# Patient Record
Sex: FEMALE | Race: OTHER | NOT HISPANIC OR LATINO | URBAN - METROPOLITAN AREA
[De-identification: names, ages, dates, MRNs, and addresses within clinical notes are randomized per-mention and may not be internally consistent; named-entity substitution may affect disease eponyms.]

---

## 2019-09-30 ENCOUNTER — EMERGENCY (EMERGENCY)
Facility: HOSPITAL | Age: 44
LOS: 0 days | Discharge: HOME | End: 2019-09-30
Attending: EMERGENCY MEDICINE | Admitting: EMERGENCY MEDICINE
Payer: COMMERCIAL

## 2019-09-30 ENCOUNTER — RESULT REVIEW (OUTPATIENT)
Age: 44
End: 2019-09-30

## 2019-09-30 VITALS
HEIGHT: 62 IN | SYSTOLIC BLOOD PRESSURE: 129 MMHG | RESPIRATION RATE: 18 BRPM | OXYGEN SATURATION: 99 % | WEIGHT: 149.91 LBS | DIASTOLIC BLOOD PRESSURE: 70 MMHG | HEART RATE: 86 BPM | TEMPERATURE: 98 F

## 2019-09-30 VITALS
TEMPERATURE: 98 F | SYSTOLIC BLOOD PRESSURE: 135 MMHG | HEART RATE: 78 BPM | RESPIRATION RATE: 18 BRPM | DIASTOLIC BLOOD PRESSURE: 72 MMHG

## 2019-09-30 DIAGNOSIS — D64.9 ANEMIA, UNSPECIFIED: ICD-10-CM

## 2019-09-30 DIAGNOSIS — N93.9 ABNORMAL UTERINE AND VAGINAL BLEEDING, UNSPECIFIED: ICD-10-CM

## 2019-09-30 DIAGNOSIS — R53.1 WEAKNESS: ICD-10-CM

## 2019-09-30 DIAGNOSIS — I10 ESSENTIAL (PRIMARY) HYPERTENSION: ICD-10-CM

## 2019-09-30 LAB
ABO RH CONFIRMATION: SIGNIFICANT CHANGE UP
ALBUMIN SERPL ELPH-MCNC: 4.3 G/DL — SIGNIFICANT CHANGE UP (ref 3.5–5.2)
ALP SERPL-CCNC: 65 U/L — SIGNIFICANT CHANGE UP (ref 30–115)
ALT FLD-CCNC: 20 U/L — SIGNIFICANT CHANGE UP (ref 0–41)
ANION GAP SERPL CALC-SCNC: 10 MMOL/L — SIGNIFICANT CHANGE UP (ref 7–14)
APTT BLD: 27.7 SEC — SIGNIFICANT CHANGE UP (ref 27–39.2)
AST SERPL-CCNC: 38 U/L — SIGNIFICANT CHANGE UP (ref 0–41)
BASOPHILS # BLD AUTO: 0.05 K/UL — SIGNIFICANT CHANGE UP (ref 0–0.2)
BASOPHILS NFR BLD AUTO: 0.6 % — SIGNIFICANT CHANGE UP (ref 0–1)
BILIRUB SERPL-MCNC: <0.2 MG/DL — SIGNIFICANT CHANGE UP (ref 0.2–1.2)
BLD GP AB SCN SERPL QL: SIGNIFICANT CHANGE UP
BUN SERPL-MCNC: 13 MG/DL — SIGNIFICANT CHANGE UP (ref 10–20)
CA-I SERPL-SCNC: 1.18 MMOL/L — SIGNIFICANT CHANGE UP (ref 1.12–1.3)
CALCIUM SERPL-MCNC: 9 MG/DL — SIGNIFICANT CHANGE UP (ref 8.5–10.1)
CHLORIDE SERPL-SCNC: 105 MMOL/L — SIGNIFICANT CHANGE UP (ref 98–110)
CO2 SERPL-SCNC: 22 MMOL/L — SIGNIFICANT CHANGE UP (ref 17–32)
CREAT SERPL-MCNC: 0.8 MG/DL — SIGNIFICANT CHANGE UP (ref 0.7–1.5)
EOSINOPHIL # BLD AUTO: 0.28 K/UL — SIGNIFICANT CHANGE UP (ref 0–0.7)
EOSINOPHIL NFR BLD AUTO: 3.3 % — SIGNIFICANT CHANGE UP (ref 0–8)
GAS PNL BLDV: 142 MMOL/L — SIGNIFICANT CHANGE UP (ref 136–145)
GAS PNL BLDV: SIGNIFICANT CHANGE UP
GLUCOSE SERPL-MCNC: 105 MG/DL — HIGH (ref 70–99)
HCG SERPL QL: NEGATIVE — SIGNIFICANT CHANGE UP
HCO3 BLDV-SCNC: 25 MMOL/L — SIGNIFICANT CHANGE UP (ref 22–29)
HCT VFR BLD CALC: 22.9 % — LOW (ref 37–47)
HCT VFR BLDA CALC: 24.8 % — LOW (ref 34–44)
HGB BLD CALC-MCNC: 8.1 G/DL — LOW (ref 14–18)
HGB BLD-MCNC: 7.1 G/DL — LOW (ref 12–16)
IMM GRANULOCYTES NFR BLD AUTO: 0.4 % — HIGH (ref 0.1–0.3)
INR BLD: 0.88 RATIO — SIGNIFICANT CHANGE UP (ref 0.65–1.3)
LACTATE BLDV-MCNC: 0.9 MMOL/L — SIGNIFICANT CHANGE UP (ref 0.5–1.6)
LYMPHOCYTES # BLD AUTO: 1.88 K/UL — SIGNIFICANT CHANGE UP (ref 1.2–3.4)
LYMPHOCYTES # BLD AUTO: 22.1 % — SIGNIFICANT CHANGE UP (ref 20.5–51.1)
MCHC RBC-ENTMCNC: 24.7 PG — LOW (ref 27–31)
MCHC RBC-ENTMCNC: 31 G/DL — LOW (ref 32–37)
MCV RBC AUTO: 79.5 FL — LOW (ref 81–99)
MONOCYTES # BLD AUTO: 0.42 K/UL — SIGNIFICANT CHANGE UP (ref 0.1–0.6)
MONOCYTES NFR BLD AUTO: 4.9 % — SIGNIFICANT CHANGE UP (ref 1.7–9.3)
NEUTROPHILS # BLD AUTO: 5.85 K/UL — SIGNIFICANT CHANGE UP (ref 1.4–6.5)
NEUTROPHILS NFR BLD AUTO: 68.7 % — SIGNIFICANT CHANGE UP (ref 42.2–75.2)
NRBC # BLD: 0 /100 WBCS — SIGNIFICANT CHANGE UP (ref 0–0)
PCO2 BLDV: 43 MMHG — SIGNIFICANT CHANGE UP (ref 41–51)
PH BLDV: 7.38 — SIGNIFICANT CHANGE UP (ref 7.26–7.43)
PLATELET # BLD AUTO: 541 K/UL — HIGH (ref 130–400)
PO2 BLDV: 46 MMHG — HIGH (ref 20–40)
POTASSIUM BLDV-SCNC: 4 MMOL/L — SIGNIFICANT CHANGE UP (ref 3.3–5.6)
POTASSIUM SERPL-MCNC: 5.8 MMOL/L — HIGH (ref 3.5–5)
POTASSIUM SERPL-SCNC: 5.8 MMOL/L — HIGH (ref 3.5–5)
PROT SERPL-MCNC: 7.2 G/DL — SIGNIFICANT CHANGE UP (ref 6–8)
PROTHROM AB SERPL-ACNC: 10.1 SEC — SIGNIFICANT CHANGE UP (ref 9.95–12.87)
RBC # BLD: 2.88 M/UL — LOW (ref 4.2–5.4)
RBC # FLD: 15.1 % — HIGH (ref 11.5–14.5)
SAO2 % BLDV: 79 % — SIGNIFICANT CHANGE UP
SODIUM SERPL-SCNC: 137 MMOL/L — SIGNIFICANT CHANGE UP (ref 135–146)
WBC # BLD: 8.51 K/UL — SIGNIFICANT CHANGE UP (ref 4.8–10.8)
WBC # FLD AUTO: 8.51 K/UL — SIGNIFICANT CHANGE UP (ref 4.8–10.8)

## 2019-09-30 PROCEDURE — 58100 BIOPSY OF UTERUS LINING: CPT

## 2019-09-30 PROCEDURE — 88305 TISSUE EXAM BY PATHOLOGIST: CPT | Mod: 26

## 2019-09-30 PROCEDURE — 99212 OFFICE O/P EST SF 10 MIN: CPT | Mod: 25

## 2019-09-30 PROCEDURE — 76856 US EXAM PELVIC COMPLETE: CPT | Mod: 26

## 2019-09-30 PROCEDURE — 99218: CPT

## 2019-09-30 NOTE — ED PROVIDER NOTE - OBJECTIVE STATEMENT
45 yo female, pmh of htn, presents to ed for abnormal hgb. Pt states had labs outpt friday, had hgb of 6.5. Pt states weakness and vaginal bleeding for past 4 weeks, using 4-6 pads per day for bleeding, does not had gyn fu.  with c sections, uncomplicated. Denies fever, chills, cp, sob, abd pain, nvd, numbness, tingling, dizziness, visual changes, dysuria.

## 2019-09-30 NOTE — ED PROVIDER NOTE - ATTENDING CONTRIBUTION TO CARE
45 y/o female with hx of HTN, dysfunctional uterine bleeding, presents to ED for Low H/H, 6.5 as outpatient.  No epistaxis, bruising or BRBPR/melena.  PE:  agree with above.  A/P:  Anemia due to vaginal bleeding.  GYN consult and Transfuse PRBC

## 2019-09-30 NOTE — ED CDU PROVIDER INITIAL DAY NOTE - NS ED ROS FT
Constitutional: See HPI.  Eyes: No visual changes, eye pain or discharge.  ENMT: No hearing changes, pain, discharge or infections. No neck pain or stiffness.  Cardiac: + chest pain, SOB; No edema. No chest pain with exertion.  Respiratory: No cough or respiratory distress.   GI: No nausea, vomiting, diarrhea or abdominal pain.  : +vaginal bleeding; No dysuria, frequency or burning.  MS: No myalgia, muscle weakness, joint pain or back pain.  Neuro: +lightheadedness; No headache or weakness. No LOC.  Skin: No skin rash.  Endo: No hx of DM, thyroid disease  Except as documented in HPI, all other review of systems is negative

## 2019-09-30 NOTE — ED PROVIDER NOTE - PHYSICAL EXAMINATION
Physical Exam    Vital Signs: I have reviewed the initial vital signs.  Constitutional: well-nourished, appears stated age, no acute distress  Eyes: Conjunctiva pink, Sclera clear,  Cardiovascular: S1 and S2, regular rate, regular rhythm, well-perfused extremities, radial pulses equal and 2+, pedal pulses 2+ and equal, cap refill less than 2 seconds  Respiratory: unlabored respiratory effort, clear to auscultation bilaterally no wheezing, rales and rhonchi  Gastrointestinal: soft, non-tender abdomen, no pulsatile mass, normal bowl sounds  Musculoskeletal: supple neck, no lower extremity edema, no midline tenderness  Integumentary: warm, dry, no rash  Neurologic: awake, alert, nvi, extremities’ motor and sensory functions grossly intact Physical Exam    Vital Signs: I have reviewed the initial vital signs.  Constitutional: well-nourished, appears stated age, no acute distress  Eyes: Conjunctiva pink, Sclera clear,  Cardiovascular: S1 and S2, regular rate, regular rhythm, well-perfused extremities, radial pulses equal and 2+, pedal pulses 2+ and equal, cap refill less than 2 seconds  Respiratory: unlabored respiratory effort, clear to auscultation bilaterally no wheezing, rales and rhonchi  Gastrointestinal: soft, non-tender abdomen, no pulsatile mass, normal bowl sounds  : Chaperoned with RN Luba, normal external exam, blood in vaginal vault, no cmt, cervix closed.   Musculoskeletal: supple neck, no lower extremity edema, no midline tenderness  Integumentary: warm, dry, no rash  Neurologic: awake, alert, nvi, extremities’ motor and sensory functions grossly intact

## 2019-09-30 NOTE — CONSULT NOTE ADULT - ASSESSMENT
43 yo P3, likely perimenopausal, w/ abnormal uterine bleeding, r/o polyp/fibroid vs hyperplasia vs cancer, currently clinically and hemodynamically stable.    Discussed need for endometrial biopsy to r/o polyp/fibroid vs hyperplasia vs cancer. Explained risks of procedure, including bleeding, infection, perforation. Pt expressed understand, informed consent obtained.     - f/up EmBx pathology  - c/w transfusion 2U PRBCs  - f/up at [facility] for results and outpatient GYN care  - bleeding precautions  - dispo per ED    Discussed with Dr. Arriaga and to be discussed with Dr. Klein. 43 yo P3, likely perimenopausal, w/ abnormal uterine bleeding, r/o polyp or fibroid vs hyperplasia vs cancer, currently clinically and hemodynamically stable.    Discussed need for endometrial biopsy to r/o polyp or fibroid vs hyperplasia vs cancer. Explained risks of procedure, including bleeding, infection, perforation. Pt expressed understand, informed consent obtained, procedure completed without complication.     - f/up EmBx pathology  - c/w transfusion 2U PRBCs  - f/up at [facility] for results and outpatient GYN care  - bleeding precautions  - dispo per ED    Discussed with Dr. Arriaga and to be discussed with Dr. Klein. 45 yo P3, likely perimenopausal, w/ abnormal uterine bleeding, r/o polyp or fibroid vs hyperplasia vs cancer, currently clinically and hemodynamically stable.    Discussed need for endometrial biopsy to r/o polyp or fibroid vs hyperplasia vs cancer. Explained risks of procedure, including bleeding, infection, perforation. Pt expressed understand, informed consent obtained, procedure completed without complication.     - f/up EmBx pathology  - c/w transfusion 2U PRBCs  - f/up at [facility] for results and outpatient GYN care  - bleeding precautions  - dispo per ED    Discussed with Dr. Arriaga and Dr. Klein.

## 2019-09-30 NOTE — ED ADULT NURSE NOTE - OBJECTIVE STATEMENT
Pt c/o vaginal bleeding and was notified by doctor that Hgb is low on Friday, was told to go to ER. Pt denies any dizziness, chest pain, SOB, weakness. Pt is A&Ox4, steady on ambulation.

## 2019-09-30 NOTE — ED CDU PROVIDER INITIAL DAY NOTE - DETAILS
Patient presented to ED for lightheadedness, SOB, chest pain, 4 weeks of vaginal bleeding, will be placed in obs unit for blood transfusion.

## 2019-09-30 NOTE — ED CDU PROVIDER DISPOSITION NOTE - NSFOLLOWUPCLINICS_GEN_ALL_ED_FT
[facility]  OB/GYN  [address] [place] [address]  Phone: [phone]  Fax:   Follow Up Time: 1-3 Days

## 2019-09-30 NOTE — ED CDU PROVIDER DISPOSITION NOTE - NSFOLLOWUPINSTRUCTIONS_ED_ALL_ED_FT
Anemia    Anemia is a condition in which the concentration of red blood cells or hemoglobin in the blood is below normal. Hemoglobin is a substance in red blood cells that carries oxygen to the tissues of the body. Anemia results in not enough oxygen reaching these tissues which can cause symptoms such as weakness, dizziness/lightheadedness, shortness of breath, chest pain, paleness, or nausea. The cause of your anemia may or may not be determined immediately. If your hemoglobin was dangerously low, you may have received a blood transfusion. Usually reactions to transfusions occur immediately but monitor yourself for any fevers, rash, or shortness of breath.    SEEK IMMEDIATE MEDICAL CARE IF YOU HAVE ANY OF THE FOLLOWING SYMPTOMS: extreme weakness/chest pain/shortness of breath, black or bloody stools, vomiting blood, fainting, fever, or any signs of dehydration.

## 2019-09-30 NOTE — ED PROVIDER NOTE - NS ED ROS FT
Constitutional: (-) fever, (-) chills, (+) weakness  Eyes: (-) visual changes  ENT: (-) nasal congestions  Cardiovascular: (-) chest pain, (-) syncope  Respiratory: (-) cough, (-) shortness of breath, (-) dyspnea,   Gastrointestinal: (-) vomiting, (-) diarrhea, (-)nausea  Musculoskeletal: (-) neck pain, (-) back pain, (-) joint pain  Integumentary: (-) rash  Neurological: (-) headache,  (-) dizziness, (-) tingling, (-)numbness  Peripheral Vascular: (-) pain while walking, (-) leg swelling,  : (-)frequency, (-)polyuria, (-)dysuria, (-)nocturia, (-) hematuria, (+) vaginal bleeding  Allergic/Immunologic: (-) pruritus
Rashawn Pablo(Attending)

## 2019-09-30 NOTE — ED CDU PROVIDER INITIAL DAY NOTE - PHYSICAL EXAMINATION
CONSTITUTIONAL: Well-developed; well-nourished; in no acute distress.   SKIN: warm, dry; no pallor   HEAD: Normocephalic; atraumatic.  EYES: no conj injection; mild conjunctival pallor   ENT: No nasal discharge; airway clear.  NECK: Supple; non tender.  CARD: S1, S2 normal; no murmurs, gallops, or rubs. Regular rate and rhythm.   RESP: No wheezes, rales or rhonchi.  ABD: soft ntnd  EXT: Normal ROM.  No clubbing, cyanosis or edema.   NEURO: Alert, oriented, grossly unremarkable  PSYCH: Cooperative, appropriate.

## 2019-09-30 NOTE — ED CDU PROVIDER DISPOSITION NOTE - CLINICAL COURSE
Patient seen and evaluated by GYN.  Biopsy done.  Will f/u as OP.  VSS.  Transfusion uneventful.  D/C home.

## 2019-09-30 NOTE — CONSULT NOTE ADULT - SUBJECTIVE AND OBJECTIVE BOX
Chief Complaint: vaginal bleeding    HPI: 43 yo P3 w/ LMP 19 presented to ED with vaginal bleeding and anemia symptoms. Pt reports heavy vaginal bleeding since beginning of last period, changing approx 8x/day with passage of heavy clots. Has had to change pad 1x/night as well. Yesterday, bleeding significantly lessened. She has only had to change her pad once today. Has felt lightheadedness, dizziness, shortness of breath, fatigue for the past week. Seen by PMD today who said her blood count was low and to come to ED for transfusion. Has never had bleeding like this before. Denies fever, chills, CP, N/V, abdominal pain, diarrhea/constipation, dysuria, urgency/frequency.     Ob/Gyn History:  , C/Sx3, d&c x1 for RPOC                 LMP -  18                 Cycle Length -  typically regular but has been irregular for the last 6 months  Denies history of ovarian cysts, uterine fibroids, abnormal paps, or STIs  Last Pap Smear - several years ago, normal  Last Mammogram - 3 months ago, normal    Denies the following: constitutional symptoms, visual symptoms, cardiovascular symptoms, respiratory symptoms, GI symptoms, musculoskeletal symptoms, skin symptoms, neurologic symptoms, hematologic symptoms, allergic symptoms, psychiatric symptoms  Except any pertinent positives listed.     Home Medications:  Norvasc 5mg qd    Allergies  ASA (rash)    PAST MEDICAL & SURGICAL HISTORY:  HTN  c/s x3  d&c x3    FAMILY HISTORY:  breast cancer in maternal aunt (diagnosed in 50s)  lung cancer (dad)    SOCIAL HISTORY: Denies cigarette use, alcohol use, or illicit drug use    Vital Signs Last 24 Hrs  T(F): 98.2 (30 Sep 2019 17:00), Max: 98.2 (30 Sep 2019 17:00)  HR: 82 (30 Sep 2019 17:00) (82 - 86)  BP: 126/86 (30 Sep 2019 17:00) (126/86 - 129/70)  RR: 18 (30 Sep 2019 17:00) (18 - 18)    General Appearance - AAOx3, NAD  Heart - S1S2 regular rate and rhythm  Lung - CTA Bilaterally  Abdomen - Soft, nontender, nondistended, no rebound, no rigidity, no guarding, bowel sounds present    GYN/Pelvis:    Labia Majora - Normal  Labia Minora - Normal  Clitoris - Normal  Urethra - Normal  Vagina - 5cc dark blood in vagina  Cervix - no CMT, closed, no active bleeding per cervical os    Uterus:  Size - 6wk size, anteverted  Tenderness - None  Mass - None  Freely mobile    Adnexa:  Masses - None  Tenderness - None    Speculum inserted into vaginal, blood wiped away using 4x4. betadine used to clean cervix. Endometrial pipelle inserted into uterine cavity, specimen obtained and sent to pathology.     LABS:                        7.1    8.51  )-----------( 541      ( 30 Sep 2019 14:20 )             22.9       ABO Rh Confirmation: O POS (19 @ 15:06)  ABO RH Interpretation: O POS [2019 15:46  ELEE23  No historical record of blood group and Rh, requires a second sample for  retype prior to the release of any blood products.  For prenatal, a  second sample on next visit.] (19 @ 14:20)  Antibody Screen: NEG (19 @ 14:20)        137  |  105  |  13  ----------------------------<  105<H>  5.8<H>   |  22  |  0.8    Ca    9.0      30 Sep 2019 14:20    TPro  7.2  /  Alb  4.3  /  TBili  <0.2  /  DBili  x   /  AST  38  /  ALT  20  /  AlkPhos  65      PT/INR - ( 30 Sep 2019 14:20 )   PT: 10.10 sec;   INR: 0.88 ratio         PTT - ( 30 Sep 2019 14:20 )  PTT:27.7 sec        RADIOLOGY & ADDITIONAL STUDIES:  < from: US Pelvis Complete (19 @ 15:53) >  FINDINGS:    UTERUS: Measures 5.9 x 5.1 x 3.2 cm with normal echogenicity and   morphology. The endometrial echo complex measures 0.5 cm, which is   normal.     RIGHT OVARY: measures 2.0 x 1.9 x 1.7 cm, and is unremarkable. Doppler   flow is demonstrated to the right ovary.     LEFT OVARY: measures 3.2 x 2.9 x 2.8 cm and contains a 2.4 cm dominant   follicle/cyst. Doppler flow is demonstrated to the left ovary.     OTHER: No free fluid in the pelvis.    IMPRESSION:    Normal thickness endometrium.  Left ovarian dominant follicle/cyst. Chief Complaint: vaginal bleeding    HPI: 45 yo P3 w/ LMP 19 presented to ED with vaginal bleeding and anemia symptoms. Pt reports heavy vaginal bleeding since beginning of last period, changing approx 8x/day with passage of heavy clots. Has had to change pad 1x/night as well. Yesterday, bleeding significantly lessened. She has only had to change her pad once today. Has felt lightheadedness, dizziness, shortness of breath, fatigue for the past week. Seen by PMD today who said her blood count was low and to come to ED for transfusion. Has never had bleeding like this before. Denies fever, chills, CP, N/V, abdominal pain, diarrhea/constipation, dysuria, urgency/frequency.     Ob/Gyn History:  , C/Sx3, d&c x1 for RPOC                 LMP -  19                 Cycle Length -  typically regular but has been irregular for the last 6 months  Denies history of ovarian cysts, uterine fibroids, abnormal paps, or STIs  Last Pap Smear - several years ago, normal  Last Mammogram - 3 months ago, normal    Denies the following: constitutional symptoms, visual symptoms, cardiovascular symptoms, respiratory symptoms, GI symptoms, musculoskeletal symptoms, skin symptoms, neurologic symptoms, hematologic symptoms, allergic symptoms, psychiatric symptoms  Except any pertinent positives listed.     Home Medications:  Norvasc 5mg qd    Allergies  ASA (rash)    PAST MEDICAL & SURGICAL HISTORY:  HTN  c/s x3  d&c x3    FAMILY HISTORY:  breast cancer in maternal aunt (diagnosed in 50s)  lung cancer (dad)    SOCIAL HISTORY: Denies cigarette use, alcohol use, or illicit drug use    Vital Signs Last 24 Hrs  T(F): 98.2 (30 Sep 2019 17:00), Max: 98.2 (30 Sep 2019 17:00)  HR: 82 (30 Sep 2019 17:00) (82 - 86)  BP: 126/86 (30 Sep 2019 17:00) (126/86 - 129/70)  RR: 18 (30 Sep 2019 17:00) (18 - 18)    General Appearance - AAOx3, NAD  Heart - S1S2 regular rate and rhythm  Lung - CTA Bilaterally  Abdomen - Soft, nontender, nondistended, no rebound, no rigidity, no guarding, bowel sounds present    GYN/Pelvis:    Labia Majora - Normal  Labia Minora - Normal  Clitoris - Normal  Urethra - Normal  Vagina - 5cc dark blood in vagina  Cervix - no CMT, closed, no active bleeding per cervical os    Uterus:  Size - 6wk size, anteverted  Tenderness - None  Mass - None  Freely mobile    Adnexa:  Masses - None  Tenderness - None    Endometrial Biopsy Procedure Note:  Written informed consent obtained. Speculum inserted into vaginal, blood wiped away using 4x4. betadine used to clean cervix. Endometrial pipelle inserted into uterine cavity, specimen obtained and sent to pathology.     LABS:                        7.1    8.51  )-----------( 541      ( 30 Sep 2019 14:20 )             22.9       ABO Rh Confirmation: O POS (19 @ 15:06)  ABO RH Interpretation: O POS [2019 15:46  ELEE23  No historical record of blood group and Rh, requires a second sample for  retype prior to the release of any blood products.  For prenatal, a  second sample on next visit.] (19 @ 14:20)  Antibody Screen: NEG (19 @ 14:20)        137  |  105  |  13  ----------------------------<  105<H>  5.8<H>   |  22  |  0.8    Ca    9.0      30 Sep 2019 14:20    TPro  7.2  /  Alb  4.3  /  TBili  <0.2  /  DBili  x   /  AST  38  /  ALT  20  /  AlkPhos  65      PT/INR - ( 30 Sep 2019 14:20 )   PT: 10.10 sec;   INR: 0.88 ratio         PTT - ( 30 Sep 2019 14:20 )  PTT:27.7 sec        RADIOLOGY & ADDITIONAL STUDIES:  < from: US Pelvis Complete (19 @ 15:53) >  FINDINGS:    UTERUS: Measures 5.9 x 5.1 x 3.2 cm with normal echogenicity and   morphology. The endometrial echo complex measures 0.5 cm, which is   normal.     RIGHT OVARY: measures 2.0 x 1.9 x 1.7 cm, and is unremarkable. Doppler   flow is demonstrated to the right ovary.     LEFT OVARY: measures 3.2 x 2.9 x 2.8 cm and contains a 2.4 cm dominant   follicle/cyst. Doppler flow is demonstrated to the left ovary.     OTHER: No free fluid in the pelvis.    IMPRESSION:    Normal thickness endometrium.  Left ovarian dominant follicle/cyst.

## 2019-09-30 NOTE — ED CDU PROVIDER DISPOSITION NOTE - PATIENT PORTAL LINK FT
You can access the FollowMyHealth Patient Portal offered by [facility] [place] by registering at the following website: [email or web address]. By joining [employer]’s FollowMyHealth portal, you will also be able to view your health information using other applications (apps) compatible with our system.

## 2019-09-30 NOTE — ED CDU PROVIDER INITIAL DAY NOTE - OBJECTIVE STATEMENT
45 y/o female with no pmhx presents for low hemoglobin. Patient states she has had heavy menstrual bleeding for the past 4 weeks, uses approximately 6-8 pads per day. Patient states she had blood work done at PMD's last week, results came back with hb of 6.5 and was advised to come to ER. Patient admits to lightheadedness, sob, chest pain on exertion and generalized fatigue. Denies syncope.

## 2019-10-01 PROBLEM — Z00.00 ENCOUNTER FOR PREVENTIVE HEALTH EXAMINATION: Status: ACTIVE | Noted: 2019-10-01

## 2019-10-03 LAB — SURGICAL PATHOLOGY STUDY: SIGNIFICANT CHANGE UP

## 2019-10-16 ENCOUNTER — OUTPATIENT (OUTPATIENT)
Dept: OUTPATIENT SERVICES | Facility: HOSPITAL | Age: 44
LOS: 1 days | Discharge: HOME | End: 2019-10-16

## 2019-10-16 ENCOUNTER — APPOINTMENT (OUTPATIENT)
Dept: OBGYN | Facility: CLINIC | Age: 44
End: 2019-10-16
Payer: COMMERCIAL

## 2019-10-16 VITALS
BODY MASS INDEX: 31.78 KG/M2 | DIASTOLIC BLOOD PRESSURE: 92 MMHG | WEIGHT: 168.31 LBS | SYSTOLIC BLOOD PRESSURE: 128 MMHG | HEIGHT: 61 IN

## 2019-10-16 DIAGNOSIS — Z86.79 PERSONAL HISTORY OF OTHER DISEASES OF THE CIRCULATORY SYSTEM: ICD-10-CM

## 2019-10-16 DIAGNOSIS — Z80.3 FAMILY HISTORY OF MALIGNANT NEOPLASM OF BREAST: ICD-10-CM

## 2019-10-16 DIAGNOSIS — N71.9 INFLAMMATORY DISEASE OF UTERUS, UNSPECIFIED: ICD-10-CM

## 2019-10-16 DIAGNOSIS — N92.0 EXCESSIVE AND FREQUENT MENSTRUATION WITH REGULAR CYCLE: ICD-10-CM

## 2019-10-16 PROCEDURE — 99396 PREV VISIT EST AGE 40-64: CPT

## 2019-10-16 RX ORDER — DOXYCYCLINE HYCLATE 100 MG/1
100 CAPSULE ORAL TWICE DAILY
Qty: 20 | Refills: 0 | Status: ACTIVE | COMMUNITY
Start: 2019-10-16 | End: 1900-01-01

## 2019-10-16 RX ORDER — METRONIDAZOLE 500 MG/1
500 TABLET ORAL
Qty: 20 | Refills: 0 | Status: ACTIVE | COMMUNITY
Start: 2019-10-16 | End: 1900-01-01

## 2019-10-16 NOTE — PHYSICAL EXAM
[Awake] : awake [Alert] : alert [Soft] : soft [Oriented x3] : oriented to person, place, and time [Normal] : uterus [No Bleeding] : there was no active vaginal bleeding [Pap Obtained] : a Pap smear was performed [Normal Position] : in a normal position [Uterine Adnexae] : were not tender and not enlarged [RRR, No Murmurs] : RRR, no murmurs [CTAB] : CTAB [Acute Distress] : no acute distress [LAD] : no lymphadenopathy [Thyroid Nodule] : no thyroid nodule [Goiter] : no goiter [Mass] : no breast mass [Nipple Discharge] : no nipple discharge [Axillary LAD] : no axillary lymphadenopathy [Tender] : non tender [Discharge] : had no discharge [Tenderness] : nontender

## 2019-10-22 DIAGNOSIS — Z01.419 ENCOUNTER FOR GYNECOLOGICAL EXAMINATION (GENERAL) (ROUTINE) WITHOUT ABNORMAL FINDINGS: ICD-10-CM

## 2019-10-22 DIAGNOSIS — N92.0 EXCESSIVE AND FREQUENT MENSTRUATION WITH REGULAR CYCLE: ICD-10-CM

## 2019-10-22 LAB — HPV HIGH+LOW RISK DNA PNL CVX: NOT DETECTED

## 2019-11-18 NOTE — ED ADULT TRIAGE NOTE - BP NONINVASIVE SYSTOLIC (MM HG)
129 Cres[employer] Intermediate Repair Preamble Text (Leave Blank If You Do Not Want): Undermining was performed with blunt dissection.

## 2020-04-23 ENCOUNTER — INPATIENT (INPATIENT)
Facility: HOSPITAL | Age: 45
LOS: 6 days | Discharge: HOME | End: 2020-04-30
Attending: INTERNAL MEDICINE | Admitting: INTERNAL MEDICINE
Payer: COMMERCIAL

## 2020-04-23 VITALS
RESPIRATION RATE: 17 BRPM | SYSTOLIC BLOOD PRESSURE: 104 MMHG | TEMPERATURE: 103 F | OXYGEN SATURATION: 92 % | DIASTOLIC BLOOD PRESSURE: 64 MMHG | HEART RATE: 115 BPM

## 2020-04-23 LAB
ALBUMIN SERPL ELPH-MCNC: 3.6 G/DL — SIGNIFICANT CHANGE UP (ref 3.5–5.2)
ALP SERPL-CCNC: 162 U/L — HIGH (ref 30–115)
ALT FLD-CCNC: 77 U/L — HIGH (ref 0–41)
ANION GAP SERPL CALC-SCNC: 18 MMOL/L — HIGH (ref 7–14)
AST SERPL-CCNC: 122 U/L — HIGH (ref 0–41)
BASOPHILS # BLD AUTO: 0.02 K/UL — SIGNIFICANT CHANGE UP (ref 0–0.2)
BASOPHILS NFR BLD AUTO: 0.1 % — SIGNIFICANT CHANGE UP (ref 0–1)
BILIRUB SERPL-MCNC: 0.6 MG/DL — SIGNIFICANT CHANGE UP (ref 0.2–1.2)
BUN SERPL-MCNC: 23 MG/DL — HIGH (ref 10–20)
CALCIUM SERPL-MCNC: 8.2 MG/DL — LOW (ref 8.5–10.1)
CHLORIDE SERPL-SCNC: 83 MMOL/L — LOW (ref 98–110)
CO2 SERPL-SCNC: 28 MMOL/L — SIGNIFICANT CHANGE UP (ref 17–32)
CREAT SERPL-MCNC: 1.3 MG/DL — SIGNIFICANT CHANGE UP (ref 0.7–1.5)
EOSINOPHIL # BLD AUTO: 0 K/UL — SIGNIFICANT CHANGE UP (ref 0–0.7)
EOSINOPHIL NFR BLD AUTO: 0 % — SIGNIFICANT CHANGE UP (ref 0–8)
GLUCOSE SERPL-MCNC: 149 MG/DL — HIGH (ref 70–99)
HCG SERPL QL: NEGATIVE — SIGNIFICANT CHANGE UP
HCT VFR BLD CALC: 38.8 % — SIGNIFICANT CHANGE UP (ref 37–47)
HGB BLD-MCNC: 12.9 G/DL — SIGNIFICANT CHANGE UP (ref 12–16)
IMM GRANULOCYTES NFR BLD AUTO: 1 % — HIGH (ref 0.1–0.3)
LACTATE SERPL-SCNC: 1.5 MMOL/L — SIGNIFICANT CHANGE UP (ref 0.7–2)
LYMPHOCYTES # BLD AUTO: 0.55 K/UL — LOW (ref 1.2–3.4)
LYMPHOCYTES # BLD AUTO: 3.7 % — LOW (ref 20.5–51.1)
MCHC RBC-ENTMCNC: 24.8 PG — LOW (ref 27–31)
MCHC RBC-ENTMCNC: 33.2 G/DL — SIGNIFICANT CHANGE UP (ref 32–37)
MCV RBC AUTO: 74.6 FL — LOW (ref 81–99)
MONOCYTES # BLD AUTO: 0.39 K/UL — SIGNIFICANT CHANGE UP (ref 0.1–0.6)
MONOCYTES NFR BLD AUTO: 2.6 % — SIGNIFICANT CHANGE UP (ref 1.7–9.3)
NEUTROPHILS # BLD AUTO: 13.71 K/UL — HIGH (ref 1.4–6.5)
NEUTROPHILS NFR BLD AUTO: 92.6 % — HIGH (ref 42.2–75.2)
NRBC # BLD: 0 /100 WBCS — SIGNIFICANT CHANGE UP (ref 0–0)
PLATELET # BLD AUTO: 299 K/UL — SIGNIFICANT CHANGE UP (ref 130–400)
POTASSIUM SERPL-MCNC: 3.7 MMOL/L — SIGNIFICANT CHANGE UP (ref 3.5–5)
POTASSIUM SERPL-SCNC: 3.7 MMOL/L — SIGNIFICANT CHANGE UP (ref 3.5–5)
PROT SERPL-MCNC: 7.1 G/DL — SIGNIFICANT CHANGE UP (ref 6–8)
RBC # BLD: 5.2 M/UL — SIGNIFICANT CHANGE UP (ref 4.2–5.4)
RBC # FLD: 15.7 % — HIGH (ref 11.5–14.5)
SODIUM SERPL-SCNC: 129 MMOL/L — LOW (ref 135–146)
TROPONIN T SERPL-MCNC: <0.01 NG/ML — SIGNIFICANT CHANGE UP
WBC # BLD: 14.82 K/UL — HIGH (ref 4.8–10.8)
WBC # FLD AUTO: 14.82 K/UL — HIGH (ref 4.8–10.8)

## 2020-04-23 PROCEDURE — 99285 EMERGENCY DEPT VISIT HI MDM: CPT

## 2020-04-23 PROCEDURE — 71045 X-RAY EXAM CHEST 1 VIEW: CPT | Mod: 26

## 2020-04-23 PROCEDURE — 93010 ELECTROCARDIOGRAM REPORT: CPT

## 2020-04-23 RX ORDER — ACETAMINOPHEN 500 MG
975 TABLET ORAL ONCE
Refills: 0 | Status: COMPLETED | OUTPATIENT
Start: 2020-04-23 | End: 2020-04-23

## 2020-04-23 RX ORDER — SODIUM CHLORIDE 9 MG/ML
1000 INJECTION, SOLUTION INTRAVENOUS
Refills: 0 | Status: DISCONTINUED | OUTPATIENT
Start: 2020-04-23 | End: 2020-04-30

## 2020-04-23 RX ORDER — AZITHROMYCIN 500 MG/1
500 TABLET, FILM COATED ORAL ONCE
Refills: 0 | Status: COMPLETED | OUTPATIENT
Start: 2020-04-23 | End: 2020-04-23

## 2020-04-23 RX ORDER — HYDROXYCHLOROQUINE SULFATE 200 MG
800 TABLET ORAL EVERY 24 HOURS
Refills: 0 | Status: DISCONTINUED | OUTPATIENT
Start: 2020-04-23 | End: 2020-04-23

## 2020-04-23 RX ORDER — CEFEPIME 1 G/1
1000 INJECTION, POWDER, FOR SOLUTION INTRAMUSCULAR; INTRAVENOUS ONCE
Refills: 0 | Status: COMPLETED | OUTPATIENT
Start: 2020-04-23 | End: 2020-04-23

## 2020-04-23 RX ORDER — CEFTRIAXONE 500 MG/1
1000 INJECTION, POWDER, FOR SOLUTION INTRAMUSCULAR; INTRAVENOUS EVERY 24 HOURS
Refills: 0 | Status: DISCONTINUED | OUTPATIENT
Start: 2020-04-23 | End: 2020-04-23

## 2020-04-23 RX ORDER — HYDROXYCHLOROQUINE SULFATE 200 MG
800 TABLET ORAL EVERY 24 HOURS
Refills: 0 | Status: DISCONTINUED | OUTPATIENT
Start: 2020-04-24 | End: 2020-04-24

## 2020-04-23 RX ORDER — CEFEPIME 1 G/1
INJECTION, POWDER, FOR SOLUTION INTRAMUSCULAR; INTRAVENOUS
Refills: 0 | Status: DISCONTINUED | OUTPATIENT
Start: 2020-04-23 | End: 2020-04-24

## 2020-04-23 RX ORDER — SODIUM CHLORIDE 9 MG/ML
1000 INJECTION, SOLUTION INTRAVENOUS ONCE
Refills: 0 | Status: COMPLETED | OUTPATIENT
Start: 2020-04-23 | End: 2020-04-23

## 2020-04-23 RX ORDER — HYDROXYCHLOROQUINE SULFATE 200 MG
TABLET ORAL
Refills: 0 | Status: DISCONTINUED | OUTPATIENT
Start: 2020-04-23 | End: 2020-04-23

## 2020-04-23 RX ORDER — CEFTRIAXONE 500 MG/1
1000 INJECTION, POWDER, FOR SOLUTION INTRAMUSCULAR; INTRAVENOUS ONCE
Refills: 0 | Status: COMPLETED | OUTPATIENT
Start: 2020-04-23 | End: 2020-04-23

## 2020-04-23 RX ORDER — PANTOPRAZOLE SODIUM 20 MG/1
40 TABLET, DELAYED RELEASE ORAL
Refills: 0 | Status: DISCONTINUED | OUTPATIENT
Start: 2020-04-23 | End: 2020-04-24

## 2020-04-23 RX ORDER — ENOXAPARIN SODIUM 100 MG/ML
40 INJECTION SUBCUTANEOUS DAILY
Refills: 0 | Status: DISCONTINUED | OUTPATIENT
Start: 2020-04-23 | End: 2020-04-26

## 2020-04-23 RX ORDER — CEFEPIME 1 G/1
1000 INJECTION, POWDER, FOR SOLUTION INTRAMUSCULAR; INTRAVENOUS EVERY 12 HOURS
Refills: 0 | Status: DISCONTINUED | OUTPATIENT
Start: 2020-04-24 | End: 2020-04-24

## 2020-04-23 RX ORDER — ACETAMINOPHEN 500 MG
650 TABLET ORAL EVERY 6 HOURS
Refills: 0 | Status: DISCONTINUED | OUTPATIENT
Start: 2020-04-23 | End: 2020-04-30

## 2020-04-23 RX ORDER — HYDROXYCHLOROQUINE SULFATE 200 MG
TABLET ORAL
Refills: 0 | Status: DISCONTINUED | OUTPATIENT
Start: 2020-04-24 | End: 2020-04-24

## 2020-04-23 RX ORDER — CHLORTHALIDONE 50 MG
1 TABLET ORAL
Qty: 0 | Refills: 0 | DISCHARGE

## 2020-04-23 RX ORDER — CHLORHEXIDINE GLUCONATE 213 G/1000ML
1 SOLUTION TOPICAL
Refills: 0 | Status: DISCONTINUED | OUTPATIENT
Start: 2020-04-23 | End: 2020-04-30

## 2020-04-23 RX ORDER — AMLODIPINE BESYLATE 2.5 MG/1
1 TABLET ORAL
Qty: 0 | Refills: 0 | DISCHARGE

## 2020-04-23 RX ADMIN — CEFTRIAXONE 100 MILLIGRAM(S): 500 INJECTION, POWDER, FOR SOLUTION INTRAMUSCULAR; INTRAVENOUS at 16:44

## 2020-04-23 RX ADMIN — Medication 650 MILLIGRAM(S): at 21:26

## 2020-04-23 RX ADMIN — Medication 975 MILLIGRAM(S): at 14:34

## 2020-04-23 RX ADMIN — AZITHROMYCIN 255 MILLIGRAM(S): 500 TABLET, FILM COATED ORAL at 16:57

## 2020-04-23 RX ADMIN — CEFEPIME 100 MILLIGRAM(S): 1 INJECTION, POWDER, FOR SOLUTION INTRAMUSCULAR; INTRAVENOUS at 19:30

## 2020-04-23 RX ADMIN — SODIUM CHLORIDE 1000 MILLILITER(S): 9 INJECTION, SOLUTION INTRAVENOUS at 14:34

## 2020-04-23 RX ADMIN — SODIUM CHLORIDE 75 MILLILITER(S): 9 INJECTION, SOLUTION INTRAVENOUS at 17:38

## 2020-04-23 RX ADMIN — SODIUM CHLORIDE 75 MILLILITER(S): 9 INJECTION, SOLUTION INTRAVENOUS at 21:26

## 2020-04-23 NOTE — H&P ADULT - ASSESSMENT
43y/o PMH of HTN presents with worsening fever, SOB, URI sxs found to have CXR w/ B/l opacities.    #Pneumonia, r/o COVID, possible overlying bacterial PNA  - hypoxemic to 84% on RA  - CXR b/l opacities w/ dense R opacity  - tylenol, inhalers PRN  - f/u COVID19  - QTc   - WBC 14 (leukopenia 0.55 + neutrophilic shift)  - transaminitis noted, trend  - f/u procal, CRP, LDH, ferritin, D-dimer  - f/u BCx  - start hydroxychloroquine  - start ceftriaxone  - ID consult     #Hyponatremia  - likely dehydration, insensible loses and pt not eating  - c/w LR @ 75 for 24hr    #HTN  - BP borderline, hold meds for now    #MISC  - DVT ppx: lovenox   - GI ppx: PPI  - Activity: as tolerated  - Diet: DASH 43y/o PMH of HTN presents with worsening fever, SOB, URI sxs found to have CXR w/ B/l opacities.    #Pneumonia, r/o COVID, possible overlying bacterial PNA  - hypoxemic to 84% on RA  - CXR b/l opacities w/ dense R opacity  - tylenol, inhalers PRN  - f/u COVID19 PCR  - QTc (EKG PENDING)  - WBC 14 (leukopenia 0.55 + neutrophilic shift)  - transaminitis noted, trend  - f/u procal, CRP, LDH, ferritin, D-dimer  - f/u BCx  - start hydroxychloroquine  - start cefepime (was on augmentin and worsened)  - ID consult     #Hyponatremia  - likely dehydration, insensible loses and pt not eating  - c/w LR @ 75 for 24hr    #HTN  - BP borderline, hold meds for now    #MISC  - DVT ppx: lovenox   - GI ppx: PPI  - Activity: as tolerated  - Diet: DASH

## 2020-04-23 NOTE — ED PROVIDER NOTE - OBJECTIVE STATEMENT
The pt is a 44y F with PMH HTN is presenting to ED  with fever and cough, > 7 days, mod, non productive, no associated pain or radiation. Associated with decreased appetite, worsening SOB and intermittent chest pain. sob aggravated with exertion. pt denies lightheadedness, dizziness, ear pain, sore throat, wheezing, vomiting/diarrhea, abd pain, dysuria, back pain, rashes. pt states she was tested last week for COVID which was neg.

## 2020-04-23 NOTE — ED PROVIDER NOTE - PROGRESS NOTE DETAILS
When pt ambulates spo2 drops to 87-89%, pt sating at 92% on RA. pt improves with 2L of supplemental O2. Attending Note:   45 yo F p/w 1 week of viral sx, URI, cough, SOB. Pt had negative COVID test x1 week ago, today with fever, pulse ox 87% on exertion, 95-96% on 2L. On exam: Non toxic, no respiratory distress. Agree with above exam by PA. Plan: Labs, imaging and reassess. Authored by Dr. Gray: pending cxr Authored by Dr. Gray: cxr reviewed. will add IV abx.  admit Authored by CURTIS Watkins: spoke with patient about results and admission. pt endorses understanding

## 2020-04-23 NOTE — H&P ADULT - HISTORY OF PRESENT ILLNESS
45y/o F with PMH of HTN presents with URI sxs. Started on - with moderate non-productive cough and fever ~T101, lightheadness, abd pain, N/V/D. She did have dec appetite and mild SOB which worsened over past few days with some chest tightness. She was tested 1wk ago for COVID which was neg. Since then her sxs have persisted and worsened, a/w with inc fevers to T103 so presented to ED for eval.     In ED: Tmax 103.2,  > 99, /67, RR18, SpO2 87% on RA, 96% on 2L NC.   CXR show b/l infiltrates w/ dense wedge infiltrate in RUL. Labs WBC 14 w/ leukopenia and L shift, hypoNa 129, Cr 1.3, mild transaminitis. Was given ceftriaxone, azithro, LR 1L and tylenol. COVID sent. 45y/o F with PMH of HTN presents with URI sxs. Started on - with moderate non-productive cough and fever ~T101, lightheadness, abd pain, N/V/D. She did have dec appetite and mild SOB which worsened over past few days with some chest tightness. She was tested 1wk ago for COVID which was neg. Since then her sxs have persisted and worsened, a/w with inc fevers to T103 so presented to ED for eval.     In ED: Tmax 103.2,  > 99, /67, RR18, SpO2 87% on RA, 96% on 2L NC.   CXR show b/l infiltrates w/ dense wedge infiltrate in RUL. Labs WBC 14 w/ leukopenia and L shift, hypoNa 129, Cr 1.3, mild transaminitis. Lactate 1.5. Was given ceftriaxone, azithro, LR 1L and tylenol. COVID sent. 43y/o F with PMH of HTN presents with URI sxs. Started on 4/16 - with low grade fever, non-productive cough. Was swabbed 4/17 and then called and told it was neg. On 4/20 she was still having sxs so called her PCP who started her on augmentin. No lightheadness, abd pain, N/V/D. She does have dec appetite and mild SOB which worsened over past few days with some chest tightness. Since then her sxs have persisted and worsened, a/w with inc fevers to T103 so presented to ED for eval.     In ED: Tmax 103.2,  > 99, /67, RR18, SpO2 87% on RA, 96% on 2L NC.   CXR show b/l infiltrates w/ dense wedge infiltrate in RUL. Labs WBC 14 w/ leukopenia and L shift, hypoNa 129, Cr 1.3, mild transaminitis. Lactate 1.5. Was given ceftriaxone, azithro, LR 1L and tylenol. COVID sent.

## 2020-04-23 NOTE — ED PROVIDER NOTE - NS ED ROS FT
GEN: (+) fever, (-) chills, (-) malaise, (-) dec appetite   HEENT: (-) vision changes, (-) HA, (-) sore throat, (-) ear pain  CV: (+) chest pain, (-) palpitations, (-) edema  PULM: (+) cough, (-) wheezing, (+) SOB, (-) orthopnea, (-) hemoptysis   GI: (-) abdominal pain,(-) Nausea, (-) Vomiting, (-) Diarrhea, (-) Melena  NEURO: (-) weakness, (-) paresthesias, (-) syncope, (-) lightheadedness, (-) dizziness  : (-) dysuria, (-) frequency, (-) urgency  MS: (-) back pain, (-) joint pain, (-)myalgias, (-) swelling  SKIN: (-) rashes, (-) new lesions  HEME: (-) bleeding, (-) ecchymosis

## 2020-04-23 NOTE — H&P ADULT - NSHPPHYSICALEXAM_GEN_ALL_CORE
GENERAL: NAD, well-developed  HEENT:  Atraumatic, Normocephalic; EOMI, PERRLA, conjunctiva pink, sclera white, Supple, No JVD, thyromegally or LYAD appreciated  CHEST/LUNG: Clear to auscultation bilaterally; No wheeze, ronchi or rales  HEART: Regular rate and rhythm; No murmurs, rubs, or gallops  ABDOMEN: Soft, Nontender, Nondistended; Bowel sounds present  EXTREMITIES:  2+ Peripheral Pulses, No peripheral pitting edema  PSYCH: AAOx3  NEUROLOGY: non-focal  SKIN: No rashes/lesions appreciated GENERAL: NAD, well-developed F comfortable on 2L SaO2 98%.   HEENT:  Atraumatic, Normocephalic; EOMI, PERRLA, conjunctiva pink, sclera white, Supple, No JVD appreciated  CHEST/LUNG: no wheeze/crackles but dec breath sounds noted in R middle chest  HEART: Regular rate and rhythm; No murmurs  ABDOMEN: Soft, Nontender, Nondistended; Bowel sounds present  EXTREMITIES:  2+ Peripheral Pulses, No peripheral pitting edema  PSYCH: AAOx3  NEUROLOGY: non-focal  SKIN: No rashes/lesions appreciated

## 2020-04-23 NOTE — H&P ADULT - NSICDXFAMILYHX_GEN_ALL_CORE_FT
FAMILY HISTORY:  FH: diabetes mellitus, mother  FH: hypertension, father  FH: lung cancer, father (smoker)

## 2020-04-23 NOTE — ED PROVIDER NOTE - PHYSICAL EXAMINATION
GEN: Alert & Oriented x 3, Mild resp distress. Calm, appropriate.  Head and Neck: Normocephalic, atraumatic. No cervical lymphadenopathy.   Eyes: PERRL. No conjunctival injection. No scleral icterus.   RESP: Slight tachypnea, Lungs clear to auscult bilat. no wheezes, rhonchi or rales. No retractions. Equal air entry.  CARDIO: regular rate and rhythm, no murmurs, rubs or gallops. Normal S1, S2. Radial pulses 2+ bilaterally. No lower extremity edema.  ABD: Soft, Nondistended. No rebound tenderness/guarding. No pulsatile mass. No tenderness with palpation x 4 quadrants.   MS: moving all extremities.   SKIN: no rashes/lesions, no petechiae, no ecchymosis.  NEURO: CN II-XII grossly intact.  Speech and cognition normal.

## 2020-04-23 NOTE — H&P ADULT - ATTENDING COMMENTS
I saw and evaluated the patient. I have reviewed and agree with the findings and plan of care as documented above in the resident’s note (unless indicated differently below). Any necessary changes were made in the body of the text.    43 yo F pt p/w fever (Tmax of 103 on the day of presentation) and non-productive coughing. Onset on 04/16/2020. Course: persistent symptoms. Intensity: moderate. Tested for SARS-CoV-2 on 04/17 (test was negative). On 04/20/2020, she called her PMD because of persistent symptoms and was started on amoxicillin/clavulanate (but w/ no relief). As this did not provide relief and as she developed shortness of breath with exertion and chest tightness, the patient came to the ED for further evaluation. No nausea, vomiting, diarrhea and dysuria. No travel history.    ROS:  Constitutional: +fevers; no chills; +generalized weakness  Eyes: no conjunctivitis; no itching  ENT: no dysphagia; no odynophagia  CVS: no LE swelling; no PND  Resp: +SOB; +coughing; +pleuritic chest tightness  GI: no nausea; no vomiting; no diarrhea; no abd pain  : no dysuria; no hematuria  MSK: no myalgias; no arthralgias  Neuro: no headache; no focal weakness  Skin: no skin rashes  All other systems reviewed and are negative    PMHx: HTN   Meds: amlodipine 10 mg daily; chlorthalidone 25 mg daily  SurHx: denies  FHx: DM – mother; HTN – father; lung ca – father  SocHx: denies tobacco, alcohol and illicit drug use    Exam:  Vitals: BP = 95/50; P = 81; T = 98.8; RR = 18; SpO2 90 on 3 L/min via NC  General: in bed; cooperative and pleasant; appears stated age; obese  Eyes: anicteric sclerae; moist conjunctiva  HENT: AT/NC; clear oropharynx w/ moist mucous membranes  Neck: supple; trachea midline; no JVD  Lungs: lungs w/ bibasilar rales; no wheezing or rhonci; distant breath sounds rt mid lung field  CVS: RRR; S1 and S2 w/o MRG’s  Abd: BS+; soft and non-tender to palpation; no masses or HSM  Extremities: no edema; pulses 2+ b/l  Psych: appropriate affect; A&O to person, place, time and situation  Skin: normal temperature and turgor; no rashes, ulcers or nodules    Labs significant for WBC 14.82, MCV 74.6, Na 129, Cl 83, AG 18, bicarb 28, BUN 23, Cr 1.3, gluc 149, alk phos 162, AST//77, CRP 20.37, ferritin 381, procal 0.3, pH 7.38, PCO2 43  CXR: b/l opacities especially rt mid lung field opacification  EKG: NSR; qTC 371    Assessment:  (1) Acute hypoxic respiratory failure 2/2 CAP; possible viral resp syndrome 2/2 SARS-CoV-2  (2) LISA likely pre-renal  (3) Hyponatremia w/ dehydration  (4) Respiratory acidosis w/ hypochloremic metabolic alkalosis and AG metabolic acidosis  (5) Abnormal LFT’s possibly secondary to infectious process  (6) HTN – hypotensive at this time  (7) Obesity: counseled    Plan:  (1) Supportive care: PRN analgesics, anti-emetics, anti-tussives and anti-pyretics  (2) Supplemental O2: goal SpO2 > 92 [titrate as appropriate]  (3) Isolation: airborne/droplet/contact  (4) F/u SARS-CoV-2 PCR  (5) F/u blood cultures (were sent)  (6) Started hydroxychloroquine  (7) Started ceftriaxone (doubt Pseudomonas infection – no need for cefepime)  (8) Trend procalcitonin q24-48 hrs   (9) Hydration  (10) Trend BUN/Cr  (11) Send U/A  (12) Trend LFT’s  (13) Hold anti-hypertensives  (14) Check lipid profile and A1C  (15) Meds as dosed  (16) DASH diet  (17) DVT ppx w/ enoxaparin    Code status: full code

## 2020-04-24 LAB
ALBUMIN SERPL ELPH-MCNC: 2.7 G/DL — LOW (ref 3.5–5.2)
ALP SERPL-CCNC: 116 U/L — HIGH (ref 30–115)
ALT FLD-CCNC: 51 U/L — HIGH (ref 0–41)
ANION GAP SERPL CALC-SCNC: 14 MMOL/L — SIGNIFICANT CHANGE UP (ref 7–14)
AST SERPL-CCNC: 69 U/L — HIGH (ref 0–41)
BASE EXCESS BLDA CALC-SCNC: 5.4 MMOL/L — HIGH (ref -2–2)
BASOPHILS # BLD AUTO: 0.01 K/UL — SIGNIFICANT CHANGE UP (ref 0–0.2)
BASOPHILS NFR BLD AUTO: 0.1 % — SIGNIFICANT CHANGE UP (ref 0–1)
BILIRUB SERPL-MCNC: 0.3 MG/DL — SIGNIFICANT CHANGE UP (ref 0.2–1.2)
BUN SERPL-MCNC: 17 MG/DL — SIGNIFICANT CHANGE UP (ref 10–20)
CALCIUM SERPL-MCNC: 7.1 MG/DL — LOW (ref 8.5–10.1)
CHLORIDE SERPL-SCNC: 95 MMOL/L — LOW (ref 98–110)
CO2 SERPL-SCNC: 28 MMOL/L — SIGNIFICANT CHANGE UP (ref 17–32)
CREAT SERPL-MCNC: 1 MG/DL — SIGNIFICANT CHANGE UP (ref 0.7–1.5)
CRP SERPL-MCNC: 20.37 MG/DL — HIGH (ref 0–0.4)
D DIMER BLD IA.RAPID-MCNC: 382 NG/ML DDU — HIGH (ref 0–230)
EOSINOPHIL # BLD AUTO: 0 K/UL — SIGNIFICANT CHANGE UP (ref 0–0.7)
EOSINOPHIL NFR BLD AUTO: 0 % — SIGNIFICANT CHANGE UP (ref 0–8)
FERRITIN SERPL-MCNC: 381 NG/ML — HIGH (ref 15–150)
GLUCOSE SERPL-MCNC: 114 MG/DL — HIGH (ref 70–99)
HCO3 BLDA-SCNC: 29 MMOL/L — HIGH (ref 23–27)
HCT VFR BLD CALC: 32.2 % — LOW (ref 37–47)
HGB BLD-MCNC: 10 G/DL — LOW (ref 12–16)
IMM GRANULOCYTES NFR BLD AUTO: 1 % — HIGH (ref 0.1–0.3)
LYMPHOCYTES # BLD AUTO: 0.51 K/UL — LOW (ref 1.2–3.4)
LYMPHOCYTES # BLD AUTO: 3.7 % — LOW (ref 20.5–51.1)
MAGNESIUM SERPL-MCNC: 2.2 MG/DL — SIGNIFICANT CHANGE UP (ref 1.8–2.4)
MCHC RBC-ENTMCNC: 23.9 PG — LOW (ref 27–31)
MCHC RBC-ENTMCNC: 31.1 G/DL — LOW (ref 32–37)
MCV RBC AUTO: 77 FL — LOW (ref 81–99)
MONOCYTES # BLD AUTO: 0.25 K/UL — SIGNIFICANT CHANGE UP (ref 0.1–0.6)
MONOCYTES NFR BLD AUTO: 1.8 % — SIGNIFICANT CHANGE UP (ref 1.7–9.3)
NEUTROPHILS # BLD AUTO: 12.7 K/UL — HIGH (ref 1.4–6.5)
NEUTROPHILS NFR BLD AUTO: 93.4 % — HIGH (ref 42.2–75.2)
NRBC # BLD: 0 /100 WBCS — SIGNIFICANT CHANGE UP (ref 0–0)
PCO2 BLDA: 39 MMHG — SIGNIFICANT CHANGE UP (ref 38–42)
PH BLDA: 7.48 — HIGH (ref 7.38–7.42)
PLATELET # BLD AUTO: 254 K/UL — SIGNIFICANT CHANGE UP (ref 130–400)
PO2 BLDA: 59 MMHG — LOW (ref 78–95)
POTASSIUM SERPL-MCNC: 3.4 MMOL/L — LOW (ref 3.5–5)
POTASSIUM SERPL-SCNC: 3.4 MMOL/L — LOW (ref 3.5–5)
PROCALCITONIN SERPL-MCNC: 0.3 NG/ML — HIGH (ref 0.02–0.1)
PROT SERPL-MCNC: 5.3 G/DL — LOW (ref 6–8)
RBC # BLD: 4.18 M/UL — LOW (ref 4.2–5.4)
RBC # FLD: 15.9 % — HIGH (ref 11.5–14.5)
SAO2 % BLDA: 92 % — LOW (ref 94–98)
SARS-COV-2 RNA SPEC QL NAA+PROBE: DETECTED
SODIUM SERPL-SCNC: 137 MMOL/L — SIGNIFICANT CHANGE UP (ref 135–146)
WBC # BLD: 13.61 K/UL — HIGH (ref 4.8–10.8)
WBC # FLD AUTO: 13.61 K/UL — HIGH (ref 4.8–10.8)

## 2020-04-24 PROCEDURE — 99223 1ST HOSP IP/OBS HIGH 75: CPT

## 2020-04-24 PROCEDURE — 93010 ELECTROCARDIOGRAM REPORT: CPT

## 2020-04-24 PROCEDURE — 71045 X-RAY EXAM CHEST 1 VIEW: CPT | Mod: 26

## 2020-04-24 RX ORDER — HYDROXYCHLOROQUINE SULFATE 200 MG
TABLET ORAL
Refills: 0 | Status: COMPLETED | OUTPATIENT
Start: 2020-04-24 | End: 2020-04-28

## 2020-04-24 RX ORDER — HYDROXYCHLOROQUINE SULFATE 200 MG
800 TABLET ORAL EVERY 24 HOURS
Refills: 0 | Status: COMPLETED | OUTPATIENT
Start: 2020-04-24 | End: 2020-04-24

## 2020-04-24 RX ORDER — GUAIFENESIN/DEXTROMETHORPHAN 600MG-30MG
10 TABLET, EXTENDED RELEASE 12 HR ORAL EVERY 6 HOURS
Refills: 0 | Status: DISCONTINUED | OUTPATIENT
Start: 2020-04-24 | End: 2020-04-30

## 2020-04-24 RX ORDER — SODIUM CHLORIDE 9 MG/ML
1000 INJECTION INTRAMUSCULAR; INTRAVENOUS; SUBCUTANEOUS
Refills: 0 | Status: DISCONTINUED | OUTPATIENT
Start: 2020-04-24 | End: 2020-04-25

## 2020-04-24 RX ORDER — SODIUM CHLORIDE 9 MG/ML
1000 INJECTION, SOLUTION INTRAVENOUS ONCE
Refills: 0 | Status: COMPLETED | OUTPATIENT
Start: 2020-04-24 | End: 2020-04-24

## 2020-04-24 RX ORDER — CEFTRIAXONE 500 MG/1
1000 INJECTION, POWDER, FOR SOLUTION INTRAMUSCULAR; INTRAVENOUS EVERY 24 HOURS
Refills: 0 | Status: DISCONTINUED | OUTPATIENT
Start: 2020-04-24 | End: 2020-04-28

## 2020-04-24 RX ORDER — AZITHROMYCIN 500 MG/1
500 TABLET, FILM COATED ORAL DAILY
Refills: 0 | Status: DISCONTINUED | OUTPATIENT
Start: 2020-04-24 | End: 2020-04-24

## 2020-04-24 RX ORDER — SODIUM CHLORIDE 9 MG/ML
1000 INJECTION INTRAMUSCULAR; INTRAVENOUS; SUBCUTANEOUS ONCE
Refills: 0 | Status: COMPLETED | OUTPATIENT
Start: 2020-04-24 | End: 2020-04-24

## 2020-04-24 RX ORDER — HYDROXYCHLOROQUINE SULFATE 200 MG
400 TABLET ORAL EVERY 24 HOURS
Refills: 0 | Status: COMPLETED | OUTPATIENT
Start: 2020-04-25 | End: 2020-04-27

## 2020-04-24 RX ADMIN — SODIUM CHLORIDE 1000 MILLILITER(S): 9 INJECTION INTRAMUSCULAR; INTRAVENOUS; SUBCUTANEOUS at 06:55

## 2020-04-24 RX ADMIN — Medication 800 MILLIGRAM(S): at 04:08

## 2020-04-24 RX ADMIN — Medication 650 MILLIGRAM(S): at 04:17

## 2020-04-24 RX ADMIN — CEFTRIAXONE 100 MILLIGRAM(S): 500 INJECTION, POWDER, FOR SOLUTION INTRAMUSCULAR; INTRAVENOUS at 16:33

## 2020-04-24 RX ADMIN — Medication 10 MILLILITER(S): at 12:06

## 2020-04-24 RX ADMIN — Medication 10 MILLILITER(S): at 04:20

## 2020-04-24 RX ADMIN — Medication 650 MILLIGRAM(S): at 12:20

## 2020-04-24 RX ADMIN — Medication 60 MILLIGRAM(S): at 16:33

## 2020-04-24 RX ADMIN — Medication 650 MILLIGRAM(S): at 18:39

## 2020-04-24 RX ADMIN — CHLORHEXIDINE GLUCONATE 1 APPLICATION(S): 213 SOLUTION TOPICAL at 05:48

## 2020-04-24 RX ADMIN — SODIUM CHLORIDE 1000 MILLILITER(S): 9 INJECTION, SOLUTION INTRAVENOUS at 21:56

## 2020-04-24 RX ADMIN — ENOXAPARIN SODIUM 40 MILLIGRAM(S): 100 INJECTION SUBCUTANEOUS at 11:11

## 2020-04-24 RX ADMIN — PANTOPRAZOLE SODIUM 40 MILLIGRAM(S): 20 TABLET, DELAYED RELEASE ORAL at 05:48

## 2020-04-24 RX ADMIN — SODIUM CHLORIDE 1000 MILLILITER(S): 9 INJECTION INTRAMUSCULAR; INTRAVENOUS; SUBCUTANEOUS at 10:22

## 2020-04-24 NOTE — CONSULT NOTE ADULT - ASSESSMENT
ASSESSMENT  45 yo F with HTN presents with URI symptoms since 4/16 -  low grade fever, non-productive cough. Was swabbed for COVID-19 4/17 and then called and told it was neg. On 4/20 she was still having sxs so called her PCP who started her on augmentin    IMPRESSION  #PNA ruling out COVID-19 PNA     CXR with bilateral opacities , more consolidative opacity on the R    Procalcitonin, Serum: 0.30 ng/mL (04-23-20 @ 14:30)  #Sepsis on admission T>101F, Pulse>90 WBC>12  #Hyponatremia   #Transaminitis  AST  122<H>  /  ALT  77<H>  /  AlkPhos  162<H>    #Obesity BMI (kg/m2): 31.1  #QTC Calculation(Bezet) 371 ms    RECOMMENDATIONS  - F/u SARS-CoV-2 PCR   - Continue Ceftriaxone 1g q24h IV  - Send urine Ag for Strep and Legionella   - Start azithro 500mg PO and D/C if + SARS-CoV-2 PCR or if legionella urine (-)  This is an incomplete pended note, all final recommendations to follow.     Spectra [id number] ASSESSMENT  43 yo F with HTN presents with URI symptoms since 4/16 -  low grade fever, non-productive cough. Was swabbed for COVID-19 4/17 and then called and told it was neg. On 4/20 she was still having sxs so called her PCP who started her on augmentin    IMPRESSION  #PNA ruling out COVID-19 PNA     CXR with bilateral opacities , more consolidative opacity on the R    Procalcitonin, Serum: 0.30 ng/mL (04-23-20 @ 14:30)  #Sepsis on admission T>101F, Pulse>90 WBC>12  #Hyponatremia   #Transaminitis  AST  122<H>  /  ALT  77<H>  /  AlkPhos  162<H>    #Obesity BMI (kg/m2): 31.1  #QTC Calculation(Bezet) 371 ms  #Cytokine Release Syndrome   D-Dimer Assay, Quantitative: 382 ng/mL DDU (04-24-20 @ 11:25)  C-Reactive Protein, Serum: 20.37 mg/dL (04-23-20 @ 14:30)  Ferritin, Serum: 381 ng/mL (04-23-20 @ 14:30)    RECOMMENDATIONS  - F/u SARS-CoV-2 PCR   - Continue Ceftriaxone 1g q24h IV  - Send urine Ag for Strep and Legionella   - Start azithro 500mg PO and D/C if + SARS-CoV-2 PCR or if legionella urine (-)  - If continued hypoxemia call ID for Tocilizumab approval (currently does not meet criteria with inflammatory marker levels)    Spectra [id number]

## 2020-04-24 NOTE — CONSULT NOTE ADULT - SUBJECTIVE AND OBJECTIVE BOX
MAURISIO TRISTAN  44y, Female  Allergy: aspirin (Unknown)      CHIEF COMPLAINT: pneumonia (23 Apr 2020 17:14)      LOS  1d    HPI:  45 yo F with HTN presents with URI symptoms since 4/16 -  low grade fever, non-productive cough. Was swabbed for COVID-19 4/17 and then called and told it was neg. On 4/20 she was still having sxs so called her PCP who started her on augmentin. No lightheadness, abd pain, N/V/D. She does have dec appetite and mild SOB which worsened over past few days with some chest tightness. Since then her sxs have persisted and worsened, a/w with inc fevers to T103 so presented to ED for eval.     In ED: Tmax 103.2,  > 99, /67, RR18, SpO2 87% on RA, 96% on 2L NC.   CXR show b/l infiltrates w/ dense wedge infiltrate in RUL. Labs WBC 14 w/ leukopenia and L shift, hypoNa 129, Cr 1.3, mild transaminitis. Lactate 1.5. Was given ceftriaxone, azithro, LR 1L and tylenol. COVID sent. (23 Apr 2020 17:14)      PAST MEDICAL & SURGICAL HISTORY:  HTN (hypertension)  No significant past surgical history      FAMILY HISTORY  FH: lung cancer  FH: diabetes mellitus  FH: hypertension      SOCIAL HISTORY  Social History:  Denies x3, lives w/ 3 kids and mother. (23 Apr 2020 17:14)        ROS  General: Denies rigors, nightsweats  HEENT: Denies headache, rhinorrhea, sore throat, eye pain  CV: Denies CP, palpitations  PULM: as noted above   GI: Denies hematemesis, hematochezia, melena  : Denies discharge, hematuria  MSK: Denies arthralgias, myalgias  SKIN: Denies rash, lesions  NEURO: Denies paresthesias, weakness  PSYCH: Denies depression, anxiety    VITALS:  T(F): 98.8, Max: 103.2 (04-23-20 @ 13:51)  HR: 80  BP: 88/48  RR: 18Vital Signs Last 24 Hrs  T(C): 37.1 (24 Apr 2020 06:03), Max: 39.6 (23 Apr 2020 13:51)  T(F): 98.8 (24 Apr 2020 06:03), Max: 103.2 (23 Apr 2020 13:51)  HR: 80 (24 Apr 2020 06:30) (80 - 115)  BP: 88/48 (24 Apr 2020 06:30) (88/48 - 116/70)  BP(mean): --  RR: 18 (24 Apr 2020 06:03) (17 - 22)  SpO2: 93% (24 Apr 2020 06:30) (87% - 96%)    PHYSICAL EXAM:  Gen: on NC  HEENT: NCAT  Resp: b/l chest expansion  Neuro: nonfocal     TESTS & MEASUREMENTS:                        12.9   14.82 )-----------( 299      ( 23 Apr 2020 14:30 )             38.8     04-23    129<L>  |  83<L>  |  23<H>  ----------------------------<  149<H>  3.7   |  28  |  1.3    Ca    8.2<L>      23 Apr 2020 14:30    TPro  7.1  /  Alb  3.6  /  TBili  0.6  /  DBili  x   /  AST  122<H>  /  ALT  77<H>  /  AlkPhos  162<H>  04-23    eGFR if Non African American: 50 mL/min/1.73M2 (04-23-20 @ 14:30)  eGFR if African American: 57 mL/min/1.73M2 (04-23-20 @ 14:30)    LIVER FUNCTIONS - ( 23 Apr 2020 14:30 )  Alb: 3.6 g/dL / Pro: 7.1 g/dL / ALK PHOS: 162 U/L / ALT: 77 U/L / AST: 122 U/L / GGT: x                 Lactate, Blood: 1.5 mmol/L (04-23-20 @ 14:30)      INFECTIOUS DISEASES TESTING  Procalcitonin, Serum: 0.30 ng/mL (04-23-20 @ 14:30)      RADIOLOGY & ADDITIONAL TESTS:  I have personally reviewed the last Chest xray  CXR      CT      CARDIOLOGY TESTING  12 Lead ECG:   Ventricular Rate 91 BPM    Atrial Rate 91 BPM    P-R Interval 142 ms    QRS Duration 96 ms    Q-T Interval 302 ms    QTC Calculation(Bezet) 371 ms    P Axis 31 degrees    R Axis 10 degrees    T Axis 32 degrees    Diagnosis Line Normal sinus rhythm  Incomplete right bundle branch block  Cannot rule out Anterior infarct , age undetermined  Abnormal ECG    Confirmed by Augustin Jara ([id number]) on 4/23/2020 7:39:40 PM (04-23-20 @ 18:27)      MEDICATIONS  cefTRIAXone   IVPB 1000  chlorhexidine 4% Liquid 1  enoxaparin Injectable 40  hydroxychloroquine   lactated ringers. 1000      Weight  Weight (kg): 74.6 (04-23-20 @ 21:19)    ANTIBIOTICS:  cefTRIAXone   IVPB 1000 milliGRAM(s) IV Intermittent every 24 hours  hydroxychloroquine   Oral       ALLERGIES:  aspirin (Unknown) MAURISIO TRISTAN  44y, Female  Allergy: aspirin (Unknown)      CHIEF COMPLAINT: pneumonia (23 Apr 2020 17:14)      LOS  1d    HPI:  43 yo F with HTN presents with URI symptoms since 4/16 -  low grade fever, non-productive cough. Was swabbed for COVID-19 4/17 and then called and told it was neg. On 4/20 she was still having sxs so called her PCP who started her on augmentin. No lightheadness, abd pain, N/V/D. She does have dec appetite and mild SOB which worsened over past few days with some chest tightness. Since then her sxs have persisted and worsened, a/w with inc fevers to T103 so presented to ED for eval.     In ED: Tmax 103.2,  > 99, /67, RR18, SpO2 87% on RA, 96% on 2L NC.   CXR show b/l infiltrates w/ dense wedge infiltrate in RUL. Labs WBC 14 w/ leukopenia and L shift, hypoNa 129, Cr 1.3, mild transaminitis. Lactate 1.5. Was given ceftriaxone, azithro, LR 1L and tylenol. COVID sent. (23 Apr 2020 17:14)      PAST MEDICAL & SURGICAL HISTORY:  HTN (hypertension)  No significant past surgical history      FAMILY HISTORY  FH: lung cancer  FH: diabetes mellitus  FH: hypertension      SOCIAL HISTORY  Social History:  Denies x3, lives w/ 3 kids and mother. (23 Apr 2020 17:14)        ROS  General: Denies rigors, nightsweats  HEENT: Denies headache, rhinorrhea, sore throat, eye pain  CV: Denies CP, palpitations  PULM: as noted above   GI: Denies hematemesis, hematochezia, melena  : Denies discharge, hematuria  MSK: Denies arthralgias, myalgias  SKIN: Denies rash, lesions  NEURO: Denies paresthesias, weakness  PSYCH: Denies depression, anxiety    VITALS:  T(F): 98.8, Max: 103.2 (04-23-20 @ 13:51)  HR: 80  BP: 88/48  RR: 18Vital Signs Last 24 Hrs  T(C): 37.1 (24 Apr 2020 06:03), Max: 39.6 (23 Apr 2020 13:51)  T(F): 98.8 (24 Apr 2020 06:03), Max: 103.2 (23 Apr 2020 13:51)  HR: 80 (24 Apr 2020 06:30) (80 - 115)  BP: 88/48 (24 Apr 2020 06:30) (88/48 - 116/70)  BP(mean): --  RR: 18 (24 Apr 2020 06:03) (17 - 22)  SpO2: 93% (24 Apr 2020 06:30) (87% - 96%)    PHYSICAL EXAM:  Gen: on NRB tachypneic  HEENT: NCAT  Resp: b/l chest expansion  Neuro: nonfocal     TESTS & MEASUREMENTS:                        12.9   14.82 )-----------( 299      ( 23 Apr 2020 14:30 )             38.8     04-23    129<L>  |  83<L>  |  23<H>  ----------------------------<  149<H>  3.7   |  28  |  1.3    Ca    8.2<L>      23 Apr 2020 14:30    TPro  7.1  /  Alb  3.6  /  TBili  0.6  /  DBili  x   /  AST  122<H>  /  ALT  77<H>  /  AlkPhos  162<H>  04-23    eGFR if Non African American: 50 mL/min/1.73M2 (04-23-20 @ 14:30)  eGFR if African American: 57 mL/min/1.73M2 (04-23-20 @ 14:30)    LIVER FUNCTIONS - ( 23 Apr 2020 14:30 )  Alb: 3.6 g/dL / Pro: 7.1 g/dL / ALK PHOS: 162 U/L / ALT: 77 U/L / AST: 122 U/L / GGT: x                 Lactate, Blood: 1.5 mmol/L (04-23-20 @ 14:30)      INFECTIOUS DISEASES TESTING  Procalcitonin, Serum: 0.30 ng/mL (04-23-20 @ 14:30)      RADIOLOGY & ADDITIONAL TESTS:  I have personally reviewed the last Chest xray  CXR      CT      CARDIOLOGY TESTING  12 Lead ECG:   Ventricular Rate 91 BPM    Atrial Rate 91 BPM    P-R Interval 142 ms    QRS Duration 96 ms    Q-T Interval 302 ms    QTC Calculation(Bezet) 371 ms    P Axis 31 degrees    R Axis 10 degrees    T Axis 32 degrees    Diagnosis Line Normal sinus rhythm  Incomplete right bundle branch block  Cannot rule out Anterior infarct , age undetermined  Abnormal ECG    Confirmed by Augustin Jara ([id number]) on 4/23/2020 7:39:40 PM (04-23-20 @ 18:27)      MEDICATIONS  cefTRIAXone   IVPB 1000  chlorhexidine 4% Liquid 1  enoxaparin Injectable 40  hydroxychloroquine   lactated ringers. 1000      Weight  Weight (kg): 74.6 (04-23-20 @ 21:19)    ANTIBIOTICS:  cefTRIAXone   IVPB 1000 milliGRAM(s) IV Intermittent every 24 hours  hydroxychloroquine   Oral       ALLERGIES:  aspirin (Unknown)

## 2020-04-24 NOTE — PROGRESS NOTE ADULT - SUBJECTIVE AND OBJECTIVE BOX
HPI  Patient is a 44y old Female who presents with a chief complaint of pneumonia (23 Apr 2020 17:14)    Currently admitted to medicine with the primary diagnosis of Respiratory failure with hypoxia     Today is hospital day 1d.     INTERVAL HPI / OVERNIGHT EVENTS:  Patient was examined and seen at bedside. This morning she is resting comfortably in bed and reports having some dizziness after a BM overnight. Hypotensive. On 3L NC    ROS: Otherwise unremarkable     PAST MEDICAL & SURGICAL HISTORY  HTN (hypertension)  No significant past surgical history    ALLERGIES  aspirin (Unknown)    MEDICATIONS  STANDING MEDICATIONS  cefTRIAXone   IVPB 1000 milliGRAM(s) IV Intermittent every 24 hours  chlorhexidine 4% Liquid 1 Application(s) Topical <User Schedule>  enoxaparin Injectable 40 milliGRAM(s) SubCutaneous daily  hydroxychloroquine   Oral   lactated ringers. 1000 milliLiter(s) IV Continuous <Continuous>    PRN MEDICATIONS  acetaminophen   Tablet .. 650 milliGRAM(s) Oral every 6 hours PRN  guaifenesin/dextromethorphan  Syrup 10 milliLiter(s) Oral every 6 hours PRN    VITALS:  T(F): 98.8  HR: 80  BP: 88/48  RR: 18  SpO2: 93%    PHYSICAL EXAM  GEN: NAD, Resting comfortably in bed on 3L NC. Conversational  PULM: Good respiratory effort. some decreased breath sounds B/L  CVS: S1-S2, no murmurs  ABD: Soft, non-tender, non-distended, no guarding  EXT: No edema  NEURO: AAOx3, no focal deficits    LABS                        12.9   14.82 )-----------( 299      ( 23 Apr 2020 14:30 )             38.8     04-23    129<L>  |  83<L>  |  23<H>  ----------------------------<  149<H>  3.7   |  28  |  1.3    Ca    8.2<L>      23 Apr 2020 14:30    TPro  7.1  /  Alb  3.6  /  TBili  0.6  /  DBili  x   /  AST  122<H>  /  ALT  77<H>  /  AlkPhos  162<H>  04-23    Lactate, Blood: 1.5 mmol/L (04-23-20 @ 14:30)  Troponin T, Serum: <0.01 ng/mL (04-23-20 @ 14:30)      CARDIAC MARKERS ( 23 Apr 2020 14:30 )  x     / <0.01 ng/mL / x     / x     / x          RADIOLOGY    < from: Xray Chest 1 View-PORTABLE IMMEDIATE (04.23.20 @ 16:32) >    Impression:      Bilateral opacities. No pleural effusion or air leak    < end of copied text >

## 2020-04-24 NOTE — PROGRESS NOTE ADULT - ASSESSMENT
43y/o PMH of HTN presents with worsening fever, SOB, URI sxs found to have CXR w/ B/l opacities.    #Pneumonia, r/o COVID, possible overlying bacterial PNA  - hypoxemic to 84% on RA. On 3L NC now  - CXR b/l opacities w/ dense R opacity  - tylenol, inhalers PRN  - Follow up COVID-19  - QTc 371  - WBC 14 (leukopenia 0.55 + neutrophilic shift)  - transaminitis noted, trend  - Trend inflammatory parameters  - Follow up blood cultures  - start hydroxychloroquine  - On ceftriaxone  - Follow up ID    #Hyponatremia  - likely dehydration, insensible loses and pt not eating  - c/w LR @ 75 for 24hr    #HTN  - BP borderline, hold meds for now    #MISC  - DVT ppx: lovenox   - GI ppx: PPI  - Activity: as tolerated  - Diet: DASH

## 2020-04-24 NOTE — CHART NOTE - NSCHARTNOTEFT_GEN_A_CORE
HPI:  43y/o F with PMH of HTN presents with URI sxs. Started on 4/16 - with low grade fever, non-productive cough. Was swabbed 4/17 and then called and told it was neg. On 4/20 she was still having sxs so called her PCP who started her on augmentin. No lightheadness, abd pain, N/V/D. She does have dec appetite and mild SOB which worsened over past few days with some chest tightness. Since then her sxs have persisted and worsened, a/w with inc fevers to T103 so presented to ED for eval.     In ED: Tmax 103.2,  > 99, /67, RR18, SpO2 87% on RA, 96% on 2L NC.   CXR show b/l infiltrates w/ dense wedge infiltrate in RUL. Labs WBC 14 w/ leukopenia and L shift, hypoNa 129, Cr 1.3, mild transaminitis. Lactate 1.5. Was given ceftriaxone, azithro, LR 1L and tylenol. COVID sent. (23 Apr 2020 17:14)      Upgrade:  Patient desaturated on NC, transitioned to non-rebreather, saturating 96%. Called pulm fellow who requested transfer to [facility]. Starting solumedrol 60q12 as per pulm fellow as well.    43y/o PMH of HTN presents with worsening fever, SOB, URI sxs found to have CXR w/ B/l opacities.    #Pneumonia, r/o COVID, possible overlying bacterial PNA  - hypoxemic to 84% on RA. On 3L NC now  - CXR b/l opacities w/ dense R opacity  - tylenol, inhalers PRN  - Follow up COVID-19  - QTc 371  - WBC 14 (leukopenia 0.55 + neutrophilic shift)  - transaminitis noted, trend  - Trend inflammatory parameters  - Follow up blood cultures  - start hydroxychloroquine  - On ceftriaxone  - Follow up ID    #Hyponatremia  - likely dehydration, insensible loses and pt not eating  - c/w LR @ 75 for 24hr    #HTN  - BP borderline, hold meds for now    #MISC  - DVT ppx: lovenox   - GI ppx: PPI  - Activity: as tolerated  - Diet: DASH

## 2020-04-25 LAB
ALBUMIN SERPL ELPH-MCNC: 2.5 G/DL — LOW (ref 3.5–5.2)
ALP SERPL-CCNC: 106 U/L — SIGNIFICANT CHANGE UP (ref 30–115)
ALT FLD-CCNC: 37 U/L — SIGNIFICANT CHANGE UP (ref 0–41)
ANION GAP SERPL CALC-SCNC: 16 MMOL/L — HIGH (ref 7–14)
AST SERPL-CCNC: 41 U/L — SIGNIFICANT CHANGE UP (ref 0–41)
BASOPHILS # BLD AUTO: 0.01 K/UL — SIGNIFICANT CHANGE UP (ref 0–0.2)
BASOPHILS NFR BLD AUTO: 0.1 % — SIGNIFICANT CHANGE UP (ref 0–1)
BILIRUB SERPL-MCNC: <0.2 MG/DL — SIGNIFICANT CHANGE UP (ref 0.2–1.2)
BLD GP AB SCN SERPL QL: SIGNIFICANT CHANGE UP
BUN SERPL-MCNC: 15 MG/DL — SIGNIFICANT CHANGE UP (ref 10–20)
CALCIUM SERPL-MCNC: 7.1 MG/DL — LOW (ref 8.5–10.1)
CHLORIDE SERPL-SCNC: 99 MMOL/L — SIGNIFICANT CHANGE UP (ref 98–110)
CO2 SERPL-SCNC: 27 MMOL/L — SIGNIFICANT CHANGE UP (ref 17–32)
CREAT SERPL-MCNC: 0.8 MG/DL — SIGNIFICANT CHANGE UP (ref 0.7–1.5)
CRP SERPL-MCNC: 18.05 MG/DL — HIGH (ref 0–0.4)
EOSINOPHIL # BLD AUTO: 0 K/UL — SIGNIFICANT CHANGE UP (ref 0–0.7)
EOSINOPHIL NFR BLD AUTO: 0 % — SIGNIFICANT CHANGE UP (ref 0–8)
GLUCOSE BLDC GLUCOMTR-MCNC: 154 MG/DL — HIGH (ref 70–99)
GLUCOSE SERPL-MCNC: 146 MG/DL — HIGH (ref 70–99)
HCT VFR BLD CALC: 32.8 % — LOW (ref 37–47)
HGB BLD-MCNC: 10 G/DL — LOW (ref 12–16)
IMM GRANULOCYTES NFR BLD AUTO: 1 % — HIGH (ref 0.1–0.3)
LEGIONELLA AG UR QL: NEGATIVE — SIGNIFICANT CHANGE UP
LYMPHOCYTES # BLD AUTO: 0.41 K/UL — LOW (ref 1.2–3.4)
LYMPHOCYTES # BLD AUTO: 3.3 % — LOW (ref 20.5–51.1)
MAGNESIUM SERPL-MCNC: 2.3 MG/DL — SIGNIFICANT CHANGE UP (ref 1.8–2.4)
MCHC RBC-ENTMCNC: 23.4 PG — LOW (ref 27–31)
MCHC RBC-ENTMCNC: 30.5 G/DL — LOW (ref 32–37)
MCV RBC AUTO: 76.6 FL — LOW (ref 81–99)
MONOCYTES # BLD AUTO: 0.18 K/UL — SIGNIFICANT CHANGE UP (ref 0.1–0.6)
MONOCYTES NFR BLD AUTO: 1.5 % — LOW (ref 1.7–9.3)
NEUTROPHILS # BLD AUTO: 11.54 K/UL — HIGH (ref 1.4–6.5)
NEUTROPHILS NFR BLD AUTO: 94.1 % — HIGH (ref 42.2–75.2)
NRBC # BLD: 0 /100 WBCS — SIGNIFICANT CHANGE UP (ref 0–0)
PLATELET # BLD AUTO: 315 K/UL — SIGNIFICANT CHANGE UP (ref 130–400)
POTASSIUM SERPL-MCNC: 3.5 MMOL/L — SIGNIFICANT CHANGE UP (ref 3.5–5)
POTASSIUM SERPL-SCNC: 3.5 MMOL/L — SIGNIFICANT CHANGE UP (ref 3.5–5)
PROCALCITONIN SERPL-MCNC: 0.4 NG/ML — HIGH (ref 0.02–0.1)
PROT SERPL-MCNC: 5.1 G/DL — LOW (ref 6–8)
RBC # BLD: 4.28 M/UL — SIGNIFICANT CHANGE UP (ref 4.2–5.4)
RBC # FLD: 16.2 % — HIGH (ref 11.5–14.5)
SODIUM SERPL-SCNC: 142 MMOL/L — SIGNIFICANT CHANGE UP (ref 135–146)
WBC # BLD: 12.26 K/UL — HIGH (ref 4.8–10.8)
WBC # FLD AUTO: 12.26 K/UL — HIGH (ref 4.8–10.8)

## 2020-04-25 PROCEDURE — 71045 X-RAY EXAM CHEST 1 VIEW: CPT | Mod: 26

## 2020-04-25 RX ORDER — TOCILIZUMAB 20 MG/ML
400 INJECTION, SOLUTION, CONCENTRATE INTRAVENOUS ONCE
Refills: 0 | Status: COMPLETED | OUTPATIENT
Start: 2020-04-25 | End: 2020-04-25

## 2020-04-25 RX ORDER — SODIUM CHLORIDE 9 MG/ML
500 INJECTION, SOLUTION INTRAVENOUS ONCE
Refills: 0 | Status: COMPLETED | OUTPATIENT
Start: 2020-04-25 | End: 2020-04-25

## 2020-04-25 RX ADMIN — TOCILIZUMAB 100 MILLIGRAM(S): 20 INJECTION, SOLUTION, CONCENTRATE INTRAVENOUS at 14:23

## 2020-04-25 RX ADMIN — Medication 60 MILLIGRAM(S): at 06:07

## 2020-04-25 RX ADMIN — CEFTRIAXONE 100 MILLIGRAM(S): 500 INJECTION, POWDER, FOR SOLUTION INTRAMUSCULAR; INTRAVENOUS at 17:30

## 2020-04-25 RX ADMIN — Medication 60 MILLIGRAM(S): at 17:30

## 2020-04-25 RX ADMIN — SODIUM CHLORIDE 2000 MILLILITER(S): 9 INJECTION, SOLUTION INTRAVENOUS at 01:53

## 2020-04-25 RX ADMIN — ENOXAPARIN SODIUM 40 MILLIGRAM(S): 100 INJECTION SUBCUTANEOUS at 11:25

## 2020-04-25 RX ADMIN — Medication 400 MILLIGRAM(S): at 00:01

## 2020-04-25 RX ADMIN — CHLORHEXIDINE GLUCONATE 1 APPLICATION(S): 213 SOLUTION TOPICAL at 06:06

## 2020-04-25 RX ADMIN — Medication 10 MILLILITER(S): at 00:01

## 2020-04-25 NOTE — CONSULT NOTE ADULT - SUBJECTIVE AND OBJECTIVE BOX
Patient is a 44y old  Female who presents with a chief complaint of SOB (24 Apr 2020 08:59)      HPI:  43y/o F with PMH of HTN presents with SOB . Started on 4/16 - with low grade fever, non-productive cough. Was swabbed 4/17 and then called and told it was neg. On 4/20 she was still having sxs so called her PCP who started her on augmentin. No lightheadness, abd pain, N/V/D. She does have dec appetite and mild SOB which worsened over past few days with some chest tightness. Since then her sxs have persisted and worsened, a/w with inc fevers to T103 so presented to ED for eval.     In ED: Tmax 103.2,  > 99, /67, RR18, SpO2 87% on RA, 96% on 2L NC.   CXR show b/l infiltrates w/ dense wedge infiltrate in RUL. Labs WBC 14 w/ leukopenia and L shift, hypoNa 129, Cr 1.3, mild transaminitis. Lactate 1.5. Was given ceftriaxone, azithro, LR 1L and tylenol. was admitted to floor, overnight worsening SOB requiring NRM, admitted to SDU, still co SOB, cough. 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 650 milliGRAM(s) Oral every 6 hours PRN  cefTRIAXone   IVPB 1000 milliGRAM(s) IV Intermittent every 24 hours  chlorhexidine 4% Liquid 1 Application(s) Topical <User Schedule>  enoxaparin Injectable 40 milliGRAM(s) SubCutaneous daily  guaifenesin/dextromethorphan  Syrup 10 milliLiter(s) Oral every 6 hours PRN  hydroxychloroquine   Oral   hydroxychloroquine 400 milliGRAM(s) Oral every 24 hours  lactated ringers. 1000 milliLiter(s) IV Continuous <Continuous>  methylPREDNISolone sodium succinate Injectable 60 milliGRAM(s) IV Push every 12 hours  sodium chloride 0.9%. 1000 milliLiter(s) IV Continuous <Continuous>  : Home Meds:      PHYSICAL EXAM    ICU Vital Signs Last 24 Hrs  T(C): 36.4 (25 Apr 2020 08:00), Max: 39.5 (24 Apr 2020 13:02)  T(F): 97.5 (25 Apr 2020 08:00), Max: 103.1 (24 Apr 2020 13:02)  HR: 89 (25 Apr 2020 08:00) (70 - 97)  BP: 103/60 (25 Apr 2020 08:00) (76/49 - 109/58)  BP(mean): 73 (25 Apr 2020 08:00) (73 - 73)-  RR: 18 (25 Apr 2020 08:00) (18 - 20)  SpO2: 93% (25 Apr 2020 08:00) (74% - 96%)      General: ill looking, tachypneix  HEENT:  FLAKITA              Lymph Nodes: No cervical LN   Lungs: Bilateral BS, bl crackles  Cardiovascular: Regular  Abdomen: Soft, Positive BS  Extremities: No clubbing  Skin: Warm  Neurological: Non focal       04-24-20 @ 07:01  -  04-25-20 @ 07:00  --------------------------------------------------------  IN:    Lactated Ringers IV Bolus: 1500 mL    lactated ringers.: 750 mL  Total IN: 2250 mL    OUT:  Total OUT: 0 mL    Total NET: 2250 mL      04-25-20 @ 07:01  -  04-25-20 @ 09:29  --------------------------------------------------------  IN:    lactated ringers.: 75 mL  Total IN: 75 mL    OUT:  Total OUT: 0 mL    Total NET: 75 mL          LABS:                          10.0   12.26 )-----------( 315      ( 25 Apr 2020 06:23 )             32.8                                               04-25    142  |  99  |  15  ----------------------------<  146<H>  3.5   |  27  |  0.8    Ca    7.1<L>      25 Apr 2020 06:23  Mg     2.3     04-25    TPro  5.1<L>  /  Alb  2.5<L>  /  TBili  <0.2  /  DBili  x   /  AST  41  /  ALT  37  /  AlkPhos  106  04-25                                                 CARDIAC MARKERS ( 23 Apr 2020 14:30 )  x     / <0.01 ng/mL / x     / x     / x                                                LIVER FUNCTIONS - ( 25 Apr 2020 06:23 )  Alb: 2.5 g/dL / Pro: 5.1 g/dL / ALK PHOS: 106 U/L / ALT: 37 U/L / AST: 41 U/L / GGT: x                                                  Culture - Blood (collected 23 Apr 2020 18:50)  Source: .Blood Blood  Preliminary Report (25 Apr 2020 03:01):    No growth to date.    Culture - Blood (collected 23 Apr 2020 17:28)  Source: .Blood Blood  Preliminary Report (25 Apr 2020 01:02):    No growth to date.                                                                                       ABG - ( 24 Apr 2020 14:37 )  pH, Arterial: 7.48  pH, Blood: x     /  pCO2: 39    /  pO2: 59    / HCO3: 29    / Base Excess: 5.4   /  SaO2: 92                  X-Rays  reviewed    MEDICATIONS  (STANDING):  cefTRIAXone   IVPB 1000 milliGRAM(s) IV Intermittent every 24 hours  chlorhexidine 4% Liquid 1 Application(s) Topical <User Schedule>  enoxaparin Injectable 40 milliGRAM(s) SubCutaneous daily  hydroxychloroquine   Oral   hydroxychloroquine 400 milliGRAM(s) Oral every 24 hours  lactated ringers. 1000 milliLiter(s) (75 mL/Hr) IV Continuous <Continuous>  methylPREDNISolone sodium succinate Injectable 60 milliGRAM(s) IV Push every 12 hours  sodium chloride 0.9%. 1000 milliLiter(s) (75 mL/Hr) IV Continuous <Continuous>    MEDICATIONS  (PRN):  acetaminophen   Tablet .. 650 milliGRAM(s) Oral every 6 hours PRN Temp greater or equal to 38C (100.4F)  guaifenesin/dextromethorphan  Syrup 10 milliLiter(s) Oral every 6 hours PRN Cough

## 2020-04-25 NOTE — PROGRESS NOTE ADULT - ASSESSMENT
ASSESSMENT  45 yo F with HTN presents with URI symptoms since 4/16 -  low grade fever, non-productive cough. Was swabbed for COVID-19 4/17 and then called and told it was neg. On 4/20 she was still having sxs so called her PCP who started her on augmentin    IMPRESSION  #COVID-19 PNA     CXR with bilateral opacities , more consolidative opacity on the R    Procalcitonin, Serum: 0.30 ng/mL (04-23-20 @ 14:30)  #Sepsis on admission T>101F, Pulse>90 WBC>12  #Hyponatremia   #Transaminitis  AST  122<H>  /  ALT  77<H>  /  AlkPhos  162<H>    #Obesity BMI (kg/m2): 31.1  #QTC Calculation(Bezet) 371 ms  #Cytokine Release Syndrome   D-Dimer Assay, Quantitative: 382 ng/mL DDU (04-24-20 @ 11:25)  C-Reactive Protein, Serum: 20.37 mg/dL (04-23-20 @ 14:30)  Ferritin, Serum: 381 ng/mL (04-23-20 @ 14:30)      RECOMMENDATIONS  - Tocilizumab 400mg x1, and recheck d-dimer, crp, ferritin in 48h (suspect inflammatory markers on repeat will be higher)  - Continue Ceftriaxone 1g q24h IV  - Trial colchicine 0.6mg BID x 5 days, monitor Cr and for diarrhea (if diarrhea can decrease to daily)     Spectra [id number]

## 2020-04-25 NOTE — CHART NOTE - NSCHARTNOTEFT_GEN_A_CORE
HPI:  43y/o F with PMH of HTN presents with URI sxs. Started on 4/16 - with low grade fever, non-productive cough. Was swabbed 4/17 and then called and told it was neg. On 4/20 she was still having sxs so called her PCP who started her on augmentin. No lightheadness, abd pain, N/V/D. She does have dec appetite and mild SOB which worsened over past few days with some chest tightness. Since then her sxs have persisted and worsened, a/w with inc fevers to T103 so presented to ED for eval.     In ED: Tmax 103.2,  > 99, /67, RR18, SpO2 87% on RA, 96% on 2L NC.   CXR show b/l infiltrates w/ dense wedge infiltrate in RUL. Labs WBC 14 w/ leukopenia and L shift, hypoNa 129, Cr 1.3, mild transaminitis. Lactate 1.5. Was given ceftriaxone, azithro, LR 1L and tylenol. COVID sent. (23 Apr 2020 17:14)      Upgrade:  Patient desaturated on NC, transitioned to non-rebreather, saturating 96%. Called pulm fellow who requested transfer to CEU. Starting solumedrol 60q12 as per pulm fellow as well.  In CEU on NRM still saturating 90-95%, sending pt to CCU, approved by Dr Caba    43y/o PMH of HTN presents with worsening fever, SOB, URI sxs found to have CXR w/ B/l opacities.    #Pneumonia, r/o COVID, possible overlying bacterial PNA  - hypoxemic to 84% on RA. On 3L NC now  - CXR b/l opacities w/ dense R opacity  - tylenol, inhalers PRN  - Follow up COVID-19  - QTc 371  - WBC 14 (leukopenia 0.55 + neutrophilic shift)  - transaminitis noted, trend  - Trend inflammatory parameters  - Follow up blood cultures  - start hydroxychloroquine  - On ceftriaxone  - Follow up ID    #Hyponatremia  - likely dehydration, insensible loses and pt not eating  - c/w LR @ 75 for 24hr    #HTN  - BP borderline, hold meds for now    #MISC  - DVT ppx: lovenox   - GI ppx: PPI  - Activity: as tolerated  - Diet: DASH.

## 2020-04-25 NOTE — CONSULT NOTE ADULT - ASSESSMENT
IMPRESSION:    Acute hypoxemic Resp failure/ viral pneumonia/ COVID+ worsening status  HTN      PLAN:    CNS: avoid CNS depressant    HEENT:  Oral care    PULMONARY:  HOB @ 45 degrees, trial of HHFNC, solumedrol 60 q 12h, maintain SAo2 92 TO 96%, Low Threshold for intubation    CARDIOVASCULAR: keep equal balance    GI: GI prophylaxis                                          Feeding as tolerated    RENAL:  F/u  lytes.  Correct as needed. accurate I/O    INFECTIOUS DISEASE: plaquenil, dc ABX , Call ID for ANAKINRA OR TOCI ( well aware of markers however condition worsening), trend inflam markers    HEMATOLOGICAL:  DVT prophylaxis. lovenox 40 q 12h    ENDOCRINE:  Follow up FS.  Insulin protocol if needed.    CODE STATUS: FULL CODE    DISPOSITION: Upgrade to CCU , neg pressure room  guarded prognosis

## 2020-04-25 NOTE — PROGRESS NOTE ADULT - SUBJECTIVE AND OBJECTIVE BOX
MAURISIO TRISTAN  44y, Female  Allergy: aspirin (Unknown)      LOS  2d    CHIEF COMPLAINT: pneumonia (25 Apr 2020 09:28)      INTERVAL EVENTS/HPI  - NRB  - T(F): , Max: 103.1 (04-24-20 @ 13:02)  - WBC Count: 12.26 (04-25-20 @ 06:23)  WBC Count: 13.61 (04-24-20 @ 11:25)  - Creatinine, Serum: 0.8 (04-25-20 @ 06:23)  Creatinine, Serum: 1.0 (04-24-20 @ 11:25)       VITALS:  T(F): 97.5, Max: 103.1 (04-24-20 @ 13:02)  HR: 89  BP: 103/60  RR: 18Vital Signs Last 24 Hrs  T(C): 36.4 (25 Apr 2020 08:00), Max: 39.5 (24 Apr 2020 13:02)  T(F): 97.5 (25 Apr 2020 08:00), Max: 103.1 (24 Apr 2020 13:02)  HR: 89 (25 Apr 2020 08:00) (70 - 97)  BP: 103/60 (25 Apr 2020 08:00) (76/49 - 109/58)  BP(mean): 73 (25 Apr 2020 08:00) (73 - 73)  RR: 18 (25 Apr 2020 08:00) (18 - 20)  SpO2: 93% (25 Apr 2020 08:00) (74% - 96%)      FH: Non-contributory  Social Hx: Non-contributory    TESTS & MEASUREMENTS:                        10.0   12.26 )-----------( 315      ( 25 Apr 2020 06:23 )             32.8     04-25    142  |  99  |  15  ----------------------------<  146<H>  3.5   |  27  |  0.8    Ca    7.1<L>      25 Apr 2020 06:23  Mg     2.3     04-25    TPro  5.1<L>  /  Alb  2.5<L>  /  TBili  <0.2  /  DBili  x   /  AST  41  /  ALT  37  /  AlkPhos  106  04-25    eGFR if Non African American: 89 mL/min/1.73M2 (04-25-20 @ 06:23)  eGFR if : 103 mL/min/1.73M2 (04-25-20 @ 06:23)  eGFR if Non African American: 68 mL/min/1.73M2 (04-24-20 @ 11:25)  eGFR if : 79 mL/min/1.73M2 (04-24-20 @ 11:25)    LIVER FUNCTIONS - ( 25 Apr 2020 06:23 )  Alb: 2.5 g/dL / Pro: 5.1 g/dL / ALK PHOS: 106 U/L / ALT: 37 U/L / AST: 41 U/L / GGT: x               Culture - Blood (collected 04-23-20 @ 18:50)  Source: .Blood Blood  Preliminary Report (04-25-20 @ 03:01):    No growth to date.    Culture - Blood (collected 04-23-20 @ 17:28)  Source: .Blood Blood  Preliminary Report (04-25-20 @ 01:02):    No growth to date.        Lactate, Blood: 1.5 mmol/L (04-23-20 @ 14:30)      INFECTIOUS DISEASES TESTING  COVID-19 PCR: Detected (04-23-20 @ 15:00)  Procalcitonin, Serum: 0.30 (04-23-20 @ 14:30)      INFLAMMATORY MARKERS  C-Reactive Protein, Serum: 20.37 mg/dL (04-23-20 @ 14:30)      RADIOLOGY & ADDITIONAL TESTS:  I have personally reviewed the last available Chest xray  CXR      CT      CARDIOLOGY TESTING  12 Lead ECG:   Ventricular Rate 90 BPM    Atrial Rate 90 BPM    P-R Interval 148 ms    QRS Duration 100 ms    Q-T Interval 394 ms    QTC Calculation(Bezet) 481 ms    P Axis 38 degrees    R Axis 31 degrees    T Axis 38 degrees    Diagnosis Line *** Poor data quality, interpretation may be adversely affected  Normal sinus rhythm  Incomplete right bundle branch block  Prolonged QT  Abnormal ECG    Confirmed by LENKA SAUL MD ([id number]) on 4/24/2020 9:17:35 AM (04-24-20 @ 09:02)  12 Lead ECG:   Ventricular Rate 91 BPM    Atrial Rate 91 BPM    P-R Interval 142 ms    QRS Duration 96 ms    Q-T Interval 302 ms    QTC Calculation(Bezet) 371 ms    P Axis 31 degrees    R Axis 10 degrees    T Axis 32 degrees    Diagnosis Line Normal sinus rhythm  Incomplete right bundle branch block  Cannot rule out Anterior infarct , age undetermined  Abnormal ECG    Confirmed by Royzman, Augustin ([id number]) on 4/23/2020 7:39:40 PM (04-23-20 @ 18:27)      MEDICATIONS  cefTRIAXone   IVPB 1000  chlorhexidine 4% Liquid 1  enoxaparin Injectable 40  hydroxychloroquine   hydroxychloroquine 400  lactated ringers. 1000  methylPREDNISolone sodium succinate Injectable 60  sodium chloride 0.9%. 1000      WEIGHT  Weight (kg): 74.6 (04-23-20 @ 21:19)  Creatinine, Serum: 0.8 mg/dL (04-25-20 @ 06:23)  Creatinine, Serum: 1.0 mg/dL (04-24-20 @ 11:25)      ANTIBIOTICS:  cefTRIAXone   IVPB 1000 milliGRAM(s) IV Intermittent every 24 hours  hydroxychloroquine   Oral   hydroxychloroquine 400 milliGRAM(s) Oral every 24 hours      All available historical records have been reviewed

## 2020-04-26 LAB
ALBUMIN SERPL ELPH-MCNC: 2.8 G/DL — LOW (ref 3.5–5.2)
ALP SERPL-CCNC: 92 U/L — SIGNIFICANT CHANGE UP (ref 30–115)
ALT FLD-CCNC: 42 U/L — HIGH (ref 0–41)
ANION GAP SERPL CALC-SCNC: 12 MMOL/L — SIGNIFICANT CHANGE UP (ref 7–14)
AST SERPL-CCNC: 51 U/L — HIGH (ref 0–41)
BASOPHILS # BLD AUTO: 0.01 K/UL — SIGNIFICANT CHANGE UP (ref 0–0.2)
BASOPHILS NFR BLD AUTO: 0.1 % — SIGNIFICANT CHANGE UP (ref 0–1)
BILIRUB SERPL-MCNC: <0.2 MG/DL — SIGNIFICANT CHANGE UP (ref 0.2–1.2)
BUN SERPL-MCNC: 22 MG/DL — HIGH (ref 10–20)
CALCIUM SERPL-MCNC: 7.6 MG/DL — LOW (ref 8.5–10.1)
CHLORIDE SERPL-SCNC: 97 MMOL/L — LOW (ref 98–110)
CO2 SERPL-SCNC: 30 MMOL/L — SIGNIFICANT CHANGE UP (ref 17–32)
CREAT SERPL-MCNC: 0.9 MG/DL — SIGNIFICANT CHANGE UP (ref 0.7–1.5)
EOSINOPHIL # BLD AUTO: 0 K/UL — SIGNIFICANT CHANGE UP (ref 0–0.7)
EOSINOPHIL NFR BLD AUTO: 0 % — SIGNIFICANT CHANGE UP (ref 0–8)
GLUCOSE BLDC GLUCOMTR-MCNC: 170 MG/DL — HIGH (ref 70–99)
GLUCOSE BLDC GLUCOMTR-MCNC: 185 MG/DL — HIGH (ref 70–99)
GLUCOSE BLDC GLUCOMTR-MCNC: 191 MG/DL — HIGH (ref 70–99)
GLUCOSE BLDC GLUCOMTR-MCNC: 192 MG/DL — HIGH (ref 70–99)
GLUCOSE SERPL-MCNC: 171 MG/DL — HIGH (ref 70–99)
HCT VFR BLD CALC: 33 % — LOW (ref 37–47)
HGB BLD-MCNC: 10.4 G/DL — LOW (ref 12–16)
IMM GRANULOCYTES NFR BLD AUTO: 1.4 % — HIGH (ref 0.1–0.3)
LYMPHOCYTES # BLD AUTO: 0.58 K/UL — LOW (ref 1.2–3.4)
LYMPHOCYTES # BLD AUTO: 6 % — LOW (ref 20.5–51.1)
MCHC RBC-ENTMCNC: 24.4 PG — LOW (ref 27–31)
MCHC RBC-ENTMCNC: 31.5 G/DL — LOW (ref 32–37)
MCV RBC AUTO: 77.3 FL — LOW (ref 81–99)
MONOCYTES # BLD AUTO: 0.32 K/UL — SIGNIFICANT CHANGE UP (ref 0.1–0.6)
MONOCYTES NFR BLD AUTO: 3.3 % — SIGNIFICANT CHANGE UP (ref 1.7–9.3)
NEUTROPHILS # BLD AUTO: 8.63 K/UL — HIGH (ref 1.4–6.5)
NEUTROPHILS NFR BLD AUTO: 89.2 % — HIGH (ref 42.2–75.2)
NRBC # BLD: 0 /100 WBCS — SIGNIFICANT CHANGE UP (ref 0–0)
PLATELET # BLD AUTO: 434 K/UL — HIGH (ref 130–400)
POTASSIUM SERPL-MCNC: 3.9 MMOL/L — SIGNIFICANT CHANGE UP (ref 3.5–5)
POTASSIUM SERPL-SCNC: 3.9 MMOL/L — SIGNIFICANT CHANGE UP (ref 3.5–5)
PROT SERPL-MCNC: 5.2 G/DL — LOW (ref 6–8)
RBC # BLD: 4.27 M/UL — SIGNIFICANT CHANGE UP (ref 4.2–5.4)
RBC # FLD: 16.2 % — HIGH (ref 11.5–14.5)
S PNEUM AG UR QL: NEGATIVE — SIGNIFICANT CHANGE UP
SODIUM SERPL-SCNC: 139 MMOL/L — SIGNIFICANT CHANGE UP (ref 135–146)
WBC # BLD: 9.68 K/UL — SIGNIFICANT CHANGE UP (ref 4.8–10.8)
WBC # FLD AUTO: 9.68 K/UL — SIGNIFICANT CHANGE UP (ref 4.8–10.8)

## 2020-04-26 PROCEDURE — 71045 X-RAY EXAM CHEST 1 VIEW: CPT | Mod: 26

## 2020-04-26 RX ORDER — INSULIN LISPRO 100/ML
VIAL (ML) SUBCUTANEOUS
Refills: 0 | Status: DISCONTINUED | OUTPATIENT
Start: 2020-04-26 | End: 2020-04-30

## 2020-04-26 RX ORDER — ENOXAPARIN SODIUM 100 MG/ML
40 INJECTION SUBCUTANEOUS
Refills: 0 | Status: DISCONTINUED | OUTPATIENT
Start: 2020-04-26 | End: 2020-04-30

## 2020-04-26 RX ORDER — COLCHICINE 0.6 MG
0.6 TABLET ORAL
Refills: 0 | Status: DISCONTINUED | OUTPATIENT
Start: 2020-04-26 | End: 2020-04-30

## 2020-04-26 RX ADMIN — Medication 400 MILLIGRAM(S): at 01:53

## 2020-04-26 RX ADMIN — CEFTRIAXONE 100 MILLIGRAM(S): 500 INJECTION, POWDER, FOR SOLUTION INTRAMUSCULAR; INTRAVENOUS at 17:46

## 2020-04-26 RX ADMIN — Medication 0.6 MILLIGRAM(S): at 17:47

## 2020-04-26 RX ADMIN — Medication 60 MILLIGRAM(S): at 06:15

## 2020-04-26 RX ADMIN — Medication 1: at 12:34

## 2020-04-26 RX ADMIN — ENOXAPARIN SODIUM 40 MILLIGRAM(S): 100 INJECTION SUBCUTANEOUS at 17:47

## 2020-04-26 RX ADMIN — Medication 1: at 08:00

## 2020-04-26 RX ADMIN — Medication 60 MILLIGRAM(S): at 17:52

## 2020-04-26 RX ADMIN — CHLORHEXIDINE GLUCONATE 1 APPLICATION(S): 213 SOLUTION TOPICAL at 06:14

## 2020-04-26 RX ADMIN — Medication 1: at 17:46

## 2020-04-26 NOTE — PROGRESS NOTE ADULT - SUBJECTIVE AND OBJECTIVE BOX
OVERNIGHT EVENTS: on HHFNC 100%, No drips, sp toci    Vital Signs Last 24 Hrs  T(C): 37.2 (26 Apr 2020 04:00), Max: 37.2 (26 Apr 2020 04:00)  T(F): 98.9 (26 Apr 2020 04:00), Max: 98.9 (26 Apr 2020 04:00)  HR: 78 (26 Apr 2020 06:00) (78 - 98)  BP: 91/59 (26 Apr 2020 06:00) (91/59 - 115/59)  BP(mean): 68 (26 Apr 2020 06:00) (68 - 82)  RR: 26 (26 Apr 2020 06:00) (22 - 45)  SpO2: 99% (26 Apr 2020 06:00) (86% - 99%)    PHYSICAL EXAMINATION:    GENERAL: awake follows commands    HEENT: Head is normocephalic and atraumatic. Extraocular muscles are intact. Mucous membranes are moist.    NECK: Supple.    LUNGS: bl crackles    HEART: Regular rate and rhythm without murmur.    ABDOMEN: Soft, nontender, and nondistended.      EXTREMITIES: Without any cyanosis, clubbing, rash, lesions or edema.    NEUROLOGIC: Grossly intact.    SKIN: No ulceration or induration present.      LABS:                        10.4   9.68  )-----------( 434      ( 26 Apr 2020 04:30 )             33.0     04-26    139  |  97<L>  |  22<H>  ----------------------------<  171<H>  3.9   |  30  |  0.9    Ca    7.6<L>      26 Apr 2020 04:30  Mg     2.3     04-25    TPro  5.2<L>  /  Alb  2.8<L>  /  TBili  <0.2  /  DBili  x   /  AST  51<H>  /  ALT  42<H>  /  AlkPhos  92  04-26        ABG - ( 24 Apr 2020 14:37 )  pH, Arterial: 7.48  pH, Blood: x     /  pCO2: 39    /  pO2: 59    / HCO3: 29    / Base Excess: 5.4   /  SaO2: 92                          Procalcitonin, Serum: 0.40 ng/mL (04-25-20 @ 06:23)  Procalcitonin, Serum: 0.30 ng/mL (04-23-20 @ 14:30)        04-25-20 @ 07:01  -  04-26-20 @ 07:00  --------------------------------------------------------  IN: 750 mL / OUT: 600 mL / NET: 150 mL        MICROBIOLOGY:  Culture Results:   No growth to date. (04-23 @ 18:50)  Culture Results:   No growth to date. (04-23 @ 17:28)      MEDICATIONS  (STANDING):  cefTRIAXone   IVPB 1000 milliGRAM(s) IV Intermittent every 24 hours  chlorhexidine 4% Liquid 1 Application(s) Topical <User Schedule>  enoxaparin Injectable 40 milliGRAM(s) SubCutaneous daily  hydroxychloroquine   Oral   hydroxychloroquine 400 milliGRAM(s) Oral every 24 hours  insulin lispro (HumaLOG) corrective regimen sliding scale   SubCutaneous three times a day before meals  lactated ringers. 1000 milliLiter(s) (75 mL/Hr) IV Continuous <Continuous>  methylPREDNISolone sodium succinate Injectable 60 milliGRAM(s) IV Push every 12 hours    MEDICATIONS  (PRN):  acetaminophen   Tablet .. 650 milliGRAM(s) Oral every 6 hours PRN Temp greater or equal to 38C (100.4F)  guaifenesin/dextromethorphan  Syrup 10 milliLiter(s) Oral every 6 hours PRN Cough      RADIOLOGY & ADDITIONAL STUDIES:

## 2020-04-26 NOTE — PROGRESS NOTE ADULT - ASSESSMENT
#Pneumonia, r/o COVID, possible overlying bacterial PNA  #Sepsis on admission T>101F, Pulse>90 WBC>12  #Hyponatremia   #Transaminitis  AST  122<H>  /  ALT  77<H>  /  AlkPhos  162<H>    - COVID 19 +   - hypoxemic to 84% on RA. On HI flow @ 80%  - CXR b/l opacities w/ dense R opacity  - tylenol, inhalers PRN  - Hydrochloroquine ( 04/24 - ) ;  QTc 371  -Ceftriaxone ( 04/24 - )   -Colchicine ( 04/26- ) ;   -s/p Toci   - QTc 371    #Hyponatremia - resolved   - Na 139 improved from 129 on admission     #HTN  - BP borderline, hold meds for now    #MISC  - DVT ppx: lovenox   - GI ppx: PPI  - Activity: as tolerated  - Diet: DASH.

## 2020-04-26 NOTE — PROGRESS NOTE ADULT - ASSESSMENT
IMPRESSION:    Acute hypoxemic Resp failure/ viral pneumonia/ COVID+ on HHFNC  HTN      PLAN:    CNS: avoid CNS depressant    HEENT:  Oral care    PULMONARY:  HOB @ 45 degrees, DEC 80% HHFNC, solumedrol 60 q 12h, maintain SAo2 92 TO 96%,    CARDIOVASCULAR: keep equal balance    GI: GI prophylaxis                                          Feeding as tolerated    RENAL:  F/u  lytes.  Correct as needed. accurate I/O    INFECTIOUS DISEASE: plaquenil, SP TOCI  trend inflam markers    HEMATOLOGICAL:  DVT prophylaxis. lovenox 40 q 12h    ENDOCRINE:  Follow up FS.  Insulin protocol if needed.    CODE STATUS: FULL CODE    DISPOSITION: MICU  guarded prognosis

## 2020-04-26 NOTE — CHART NOTE - NSCHARTNOTEFT_GEN_A_CORE
Spoke to patient's son (Gorge Montoya [phone]). I informed the family that she is in CCU in stable condition. She is not intubated, but she requires high flow oxygen. She otherwise appears to be stable. Her vital signs are within normal limits, WBC is 9 and she has been afebrile. She has received immunomodulators and steroids. He had no questions. He expressed understanding of information given. I told her that she will receive a phone call in case of acute events, otherwise she will receive updates daily.

## 2020-04-26 NOTE — PROGRESS NOTE ADULT - SUBJECTIVE AND OBJECTIVE BOX
Patient is a 44y old  Female who presents with a chief complaint of pneumonia (25 Apr 2020 10:06)      Overnight events : NAEO. Pt continues on HFNC. SPO2 88-90   Review of Systems : CONSTITUTIONAL - No acute distress , No weight change  SKIN - No rash  HEMATOLOGIC - No abnormal bleeding or bruising  EYES - , No conjunctival injection, No drainage   ENT -, No sore throat, No neck pain, No rhinorrhea, No ear pain  CARDIAC -No chest pain, No palpitations  GI - No abdominal pain, No nausea, No vomiting, No diarrhea,  - No dysuria, frequency, hematuria.   MUSCULOSKELETAL - No joint paint, No swelling, No back pain  NEUROLOGIC - No numbness, No focal weakness, No headache, No dizziness        VITAL SIGNS     ICU Vital Signs Last 24 Hrs  T(C): 37.2 (26 Apr 2020 04:00), Max: 37.2 (26 Apr 2020 04:00)  T(F): 98.9 (26 Apr 2020 04:00), Max: 98.9 (26 Apr 2020 04:00)  HR: 78 (26 Apr 2020 06:00) (78 - 98)  BP: 91/59 (26 Apr 2020 06:00) (91/59 - 115/59)  BP(mean): 68 (26 Apr 2020 06:00) (68 - 82)  ABP: --  ABP(mean): --  RR: 26 (26 Apr 2020 06:00) (18 - 45)  SpO2: 99% (26 Apr 2020 06:00) (86% - 99%)      I&O's Detail    24 Apr 2020 07:01  -  25 Apr 2020 07:00  --------------------------------------------------------  IN:    Lactated Ringers IV Bolus: 1500 mL    lactated ringers.: 750 mL  Total IN: 2250 mL    OUT:  Total OUT: 0 mL    Total NET: 2250 mL      25 Apr 2020 07:01  -  26 Apr 2020 06:29  --------------------------------------------------------  IN:    IV PiggyBack: 150 mL    lactated ringers.: 600 mL  Total IN: 750 mL    OUT:    Voided: 600 mL  Total OUT: 600 mL    Total NET: 150 mL          Physical Exam    GENERAL: NAD, well-developed  HEAD:  Atraumatic, Normocephalic  EYES: EOMI, PERRLA, conjunctiva and sclera clear  NECK: Supple, No JVD  CHEST/LUNG: Clear to auscultation bilaterally; No wheeze  HEART: Regular rate and rhythm; No murmurs, rubs, or gallops  ABDOMEN: Soft, Nontender, Nondistended; Bowel sounds present  EXTREMITIES:  2+ Peripheral Pulses, No clubbing, cyanosis, or edema  PSYCH:   NEUROLOGY: non-focal  SKIN: No rashes or lesions    CAPILLARY BLOOD GLUCOSE      POCT Blood Glucose.: 154 mg/dL (25 Apr 2020 13:25)          LABS                          10.0   12.26 )-----------( 315      ( 25 Apr 2020 06:23 )             32.8       04-25    142  |  99  |  15  ----------------------------<  146<H>  3.5   |  27  |  0.8    Ca    7.1<L>      25 Apr 2020 06:23  Mg     2.3     04-25    TPro  5.1<L>  /  Alb  2.5<L>  /  TBili  <0.2  /  DBili  x   /  AST  41  /  ALT  37  /  AlkPhos  106  04-25      LIVER FUNCTIONS - ( 25 Apr 2020 06:23 )  Alb: 2.5 g/dL / Pro: 5.1 g/dL / ALK PHOS: 106 U/L / ALT: 37 U/L / AST: 41 U/L / GGT: x             ABG - ( 24 Apr 2020 14:37 )  pH, Arterial: 7.48  pH, Blood: x     /  pCO2: 39    /  pO2: 59    / HCO3: 29    / Base Excess: 5.4   /  SaO2: 92                              Culture - Blood (collected 23 Apr 2020 18:50)  Source: .Blood Blood  Preliminary Report (25 Apr 2020 03:01):    No growth to date.    Culture - Blood (collected 23 Apr 2020 17:28)  Source: .Blood Blood  Preliminary Report (25 Apr 2020 01:02):    No growth to date.              MEDS    MEDICATIONS  (STANDING):  cefTRIAXone   IVPB 1000 milliGRAM(s) IV Intermittent every 24 hours  chlorhexidine 4% Liquid 1 Application(s) Topical <User Schedule>  enoxaparin Injectable 40 milliGRAM(s) SubCutaneous daily  hydroxychloroquine   Oral   hydroxychloroquine 400 milliGRAM(s) Oral every 24 hours  lactated ringers. 1000 milliLiter(s) (75 mL/Hr) IV Continuous <Continuous>  methylPREDNISolone sodium succinate Injectable 60 milliGRAM(s) IV Push every 12 hours    MEDICATIONS  (PRN):  acetaminophen   Tablet .. 650 milliGRAM(s) Oral every 6 hours PRN Temp greater or equal to 38C (100.4F)  guaifenesin/dextromethorphan  Syrup 10 milliLiter(s) Oral every 6 hours PRN Cough      Radiology :    CXR   CT

## 2020-04-27 LAB
ANION GAP SERPL CALC-SCNC: 10 MMOL/L — SIGNIFICANT CHANGE UP (ref 7–14)
BASOPHILS # BLD AUTO: 0.01 K/UL — SIGNIFICANT CHANGE UP (ref 0–0.2)
BASOPHILS NFR BLD AUTO: 0.1 % — SIGNIFICANT CHANGE UP (ref 0–1)
BUN SERPL-MCNC: 31 MG/DL — HIGH (ref 10–20)
CALCIUM SERPL-MCNC: 7.8 MG/DL — LOW (ref 8.5–10.1)
CHLORIDE SERPL-SCNC: 99 MMOL/L — SIGNIFICANT CHANGE UP (ref 98–110)
CO2 SERPL-SCNC: 31 MMOL/L — SIGNIFICANT CHANGE UP (ref 17–32)
CREAT SERPL-MCNC: 0.8 MG/DL — SIGNIFICANT CHANGE UP (ref 0.7–1.5)
EOSINOPHIL # BLD AUTO: 0 K/UL — SIGNIFICANT CHANGE UP (ref 0–0.7)
EOSINOPHIL NFR BLD AUTO: 0 % — SIGNIFICANT CHANGE UP (ref 0–8)
GLUCOSE BLDC GLUCOMTR-MCNC: 168 MG/DL — HIGH (ref 70–99)
GLUCOSE BLDC GLUCOMTR-MCNC: 185 MG/DL — HIGH (ref 70–99)
GLUCOSE BLDC GLUCOMTR-MCNC: 197 MG/DL — HIGH (ref 70–99)
GLUCOSE BLDC GLUCOMTR-MCNC: 236 MG/DL — HIGH (ref 70–99)
GLUCOSE SERPL-MCNC: 188 MG/DL — HIGH (ref 70–99)
HCT VFR BLD CALC: 29.2 % — LOW (ref 37–47)
HGB BLD-MCNC: 9.4 G/DL — LOW (ref 12–16)
IMM GRANULOCYTES NFR BLD AUTO: 2.4 % — HIGH (ref 0.1–0.3)
LYMPHOCYTES # BLD AUTO: 0.63 K/UL — LOW (ref 1.2–3.4)
LYMPHOCYTES # BLD AUTO: 6.5 % — LOW (ref 20.5–51.1)
MAGNESIUM SERPL-MCNC: 2.7 MG/DL — HIGH (ref 1.8–2.4)
MCHC RBC-ENTMCNC: 24.7 PG — LOW (ref 27–31)
MCHC RBC-ENTMCNC: 32.2 G/DL — SIGNIFICANT CHANGE UP (ref 32–37)
MCV RBC AUTO: 76.8 FL — LOW (ref 81–99)
MONOCYTES # BLD AUTO: 0.41 K/UL — SIGNIFICANT CHANGE UP (ref 0.1–0.6)
MONOCYTES NFR BLD AUTO: 4.2 % — SIGNIFICANT CHANGE UP (ref 1.7–9.3)
NEUTROPHILS # BLD AUTO: 8.4 K/UL — HIGH (ref 1.4–6.5)
NEUTROPHILS NFR BLD AUTO: 86.8 % — HIGH (ref 42.2–75.2)
NRBC # BLD: 0 /100 WBCS — SIGNIFICANT CHANGE UP (ref 0–0)
PLATELET # BLD AUTO: 438 K/UL — HIGH (ref 130–400)
POTASSIUM SERPL-MCNC: 3.9 MMOL/L — SIGNIFICANT CHANGE UP (ref 3.5–5)
POTASSIUM SERPL-SCNC: 3.9 MMOL/L — SIGNIFICANT CHANGE UP (ref 3.5–5)
RBC # BLD: 3.8 M/UL — LOW (ref 4.2–5.4)
RBC # FLD: 16.2 % — HIGH (ref 11.5–14.5)
SODIUM SERPL-SCNC: 140 MMOL/L — SIGNIFICANT CHANGE UP (ref 135–146)
WBC # BLD: 9.68 K/UL — SIGNIFICANT CHANGE UP (ref 4.8–10.8)
WBC # FLD AUTO: 9.68 K/UL — SIGNIFICANT CHANGE UP (ref 4.8–10.8)

## 2020-04-27 PROCEDURE — 71045 X-RAY EXAM CHEST 1 VIEW: CPT | Mod: 26

## 2020-04-27 RX ORDER — DIPHENHYDRAMINE HCL 50 MG
25 CAPSULE ORAL ONCE
Refills: 0 | Status: DISCONTINUED | OUTPATIENT
Start: 2020-04-27 | End: 2020-04-30

## 2020-04-27 RX ORDER — FAMOTIDINE 10 MG/ML
20 INJECTION INTRAVENOUS
Refills: 0 | Status: DISCONTINUED | OUTPATIENT
Start: 2020-04-27 | End: 2020-04-30

## 2020-04-27 RX ORDER — ASPIRIN/CALCIUM CARB/MAGNESIUM 324 MG
81 TABLET ORAL ONCE
Refills: 0 | Status: COMPLETED | OUTPATIENT
Start: 2020-04-27 | End: 2020-04-27

## 2020-04-27 RX ORDER — ASPIRIN/CALCIUM CARB/MAGNESIUM 324 MG
81 TABLET ORAL DAILY
Refills: 0 | Status: DISCONTINUED | OUTPATIENT
Start: 2020-04-27 | End: 2020-04-30

## 2020-04-27 RX ADMIN — ENOXAPARIN SODIUM 40 MILLIGRAM(S): 100 INJECTION SUBCUTANEOUS at 17:53

## 2020-04-27 RX ADMIN — Medication 2: at 11:57

## 2020-04-27 RX ADMIN — Medication 400 MILLIGRAM(S): at 01:11

## 2020-04-27 RX ADMIN — Medication 60 MILLIGRAM(S): at 06:09

## 2020-04-27 RX ADMIN — CEFTRIAXONE 100 MILLIGRAM(S): 500 INJECTION, POWDER, FOR SOLUTION INTRAMUSCULAR; INTRAVENOUS at 16:25

## 2020-04-27 RX ADMIN — Medication 0.6 MILLIGRAM(S): at 06:09

## 2020-04-27 RX ADMIN — ENOXAPARIN SODIUM 40 MILLIGRAM(S): 100 INJECTION SUBCUTANEOUS at 06:09

## 2020-04-27 RX ADMIN — Medication 0.6 MILLIGRAM(S): at 17:52

## 2020-04-27 RX ADMIN — Medication 1: at 16:25

## 2020-04-27 RX ADMIN — Medication 1: at 08:36

## 2020-04-27 RX ADMIN — Medication 81 MILLIGRAM(S): at 13:20

## 2020-04-27 RX ADMIN — Medication 400 MILLIGRAM(S): at 23:06

## 2020-04-27 RX ADMIN — FAMOTIDINE 20 MILLIGRAM(S): 10 INJECTION INTRAVENOUS at 17:52

## 2020-04-27 RX ADMIN — Medication 60 MILLIGRAM(S): at 17:52

## 2020-04-27 RX ADMIN — CHLORHEXIDINE GLUCONATE 1 APPLICATION(S): 213 SOLUTION TOPICAL at 06:08

## 2020-04-27 NOTE — CDI QUERY NOTE - NSCDIOTHERTXTBX_GEN_ALL_CORE_HH
Documentation:  43y/o F with PMH of HTN presents with URI sxs. Started on 4/16 - with low grade fever, non-productive cough. Found to have viral PNA 2/2 COVID 19, possible overlying bacterial PNA.  ** ID consult 4/34: Sepsis on admission T>101F, Pulse>90 WBC>12  ** Critical care resident PN 4/26: Sepsis on admission T>101F, Pulse>90 WBC>12    Vitals:  ED: T 103.2, , RR 22    Lab shows:  WBC Count: 14.82 K/uL (04-23-20 @ 14:30)  Lactate, Blood: 1.5 mmol/L (04-23-20 @ 14:30)  D-dimer: 382 ( 4/24/2020)  procalcitonin 0.3 (4/23/2020)  CRP: 18 ( 3/25/2020)  AST/ALT: 122/77 (4/23/2020)    Treatment:   cefTRIAXone   IVPB  100 mL/Hr IV Intermittent started 4/24/2020  hydroxychloroquine started 4/25/2020     Based on your clinical evaluation of the patient, can you please clarify  (  ) Sepsis POA  (  ) Other (please specify)  (  ) Unable to determine.

## 2020-04-27 NOTE — PROGRESS NOTE ADULT - ASSESSMENT
ASSESSMENT  45 yo F with HTN presents with URI symptoms since 4/16 -  low grade fever, non-productive cough. Was swabbed for COVID-19 4/17 and then called and told it was neg. On 4/20 she was still having sxs so called her PCP who started her on augmentin    IMPRESSION  #COVID-19 PNA     CXR with bilateral opacities , more consolidative opacity on the R    Procalcitonin, Serum: 0.30 ng/mL (04-23-20 @ 14:30)  #Sepsis on admission T>101F, Pulse>90 WBC>12  #Hyponatremia   #Transaminitis  AST  122<H>  /  ALT  77<H>  /  AlkPhos  162<H>    #Obesity BMI (kg/m2): 31.1  #QTC Calculation(Bezet) 371 ms  #Cytokine Release Syndrome   D-Dimer Assay, Quantitative: 382 ng/mL DDU (04-24-20 @ 11:25)  C-Reactive Protein, Serum: 20.37 mg/dL (04-23-20 @ 14:30)  Ferritin, Serum: 381 ng/mL (04-23-20 @ 14:30)      RECOMMENDATIONS  - s/p Tocilizumab 400mg x1  - Continue Ceftriaxone 1g q24h IV  - Trial colchicine 0.6mg BID x 5 days, monitor Cr and for diarrhea (if diarrhea can decrease to daily)     Spectra [phone]

## 2020-04-27 NOTE — PROGRESS NOTE ADULT - ASSESSMENT
#Pneumonia, r/o COVID, possible overlying bacterial PNA  #Sepsis on admission T>101F, Pulse>90 WBC>12  #Hyponatremia   #Transaminitis  AST  122<H>  /  ALT  77<H>  /  AlkPhos  162<H>    - COVID 19 +   - 04/27 DC'd HFNC --> started on NC 6L   - CXR b/l opacities w/ dense R opacity- improving   - tylenol, inhalers PRN  - Hydrochloroquine ( 04/24 - ) ;  QTc 371  -Ceftriaxone ( 04/24 - )   -Colchicine x 5days  ( 04/26- )   -Solumedrol x 5 days ( 04/24 - )   -s/p Toci   - QTc 371    #Hyponatremia - resolved   - Na 139 improved from 129 on admission     #HTN  - BP borderline, hold meds for now    #MISC  - DVT ppx: lovenox   - GI ppx: PPI  - Activity: as tolerated  - Diet: DASH.

## 2020-04-27 NOTE — PROGRESS NOTE ADULT - ASSESSMENT
IMPRESSION:    Acute hypoxemic Resp failure/ viral pneumonia/ COVID+ on HHFNC 50%  HTN      PLAN:    CNS: avoid CNS depressant    HEENT:  Oral care    PULMONARY:  HOB @ 45 degrees,  50% HHFNC, solumedrol 60 q 12h, maintain SAo2 92 TO 96%, NC trila    CARDIOVASCULAR: keep equal balance    GI: GI prophylaxis                                          Feeding as tolerated    RENAL:  F/u  lytes.  Correct as needed. accurate I/O    INFECTIOUS DISEASE: plaquenil, SP TOCI / colchicine    HEMATOLOGICAL:  DVT prophylaxis. lovenox 40 q 12h    ENDOCRINE:  Follow up FS.  Insulin protocol if needed.    CODE STATUS: FULL CODE    DISPOSITION: MICU  guarded prognosis

## 2020-04-27 NOTE — CHART NOTE - NSCHARTNOTEFT_GEN_A_CORE
Spoke to patient's son (Gorge Montoya [phone]). I informed the family that she is in CCU in stable condition. She is not intubated, but she was requiring high flow oxygen. She will be switched to oxygen via a NC and see how she tolerates it.  She otherwise appears to be stable. Her vital signs are within normal limits, WBC is 9 and she has been afebrile. She has received immunomodulators (toci), on plaquenil, and steroids. She is also on full dose anticoagulation.     He had no questions. He expressed understanding of information given. I told him that he will receive a phone call in case of acute events, otherwise he will receive updates daily.

## 2020-04-27 NOTE — PROGRESS NOTE ADULT - SUBJECTIVE AND OBJECTIVE BOX
MAURISIO TRISTAN  44y, Female  Allergy: aspirin (Unknown)      LOS  4d    CHIEF COMPLAINT: pneumonia (27 Apr 2020 13:23)      INTERVAL EVENTS/HPI  - No acute events overnight  - T(F): , Max: 98.9 (04-26-20 @ 20:00)  - WBC Count: 9.68 (04-27-20 @ 04:30)  WBC Count: 9.68 (04-26-20 @ 04:30)  - Creatinine, Serum: 0.8 (04-27-20 @ 04:30)  Creatinine, Serum: 0.9 (04-26-20 @ 04:30)       VITALS:  T(F): 98, Max: 98.9 (04-26-20 @ 20:00)  HR: 68  BP: 113/69  RR: 25Vital Signs Last 24 Hrs  T(C): 36.7 (27 Apr 2020 12:00), Max: 37.2 (26 Apr 2020 20:00)  T(F): 98 (27 Apr 2020 12:00), Max: 98.9 (26 Apr 2020 20:00)  HR: 68 (27 Apr 2020 16:00) (64 - 94)  BP: 113/69 (27 Apr 2020 16:00) (93/63 - 117/71)  BP(mean): 83 (27 Apr 2020 16:00) (72 - 88)  RR: 25 (27 Apr 2020 16:00) (24 - 39)  SpO2: 100% (27 Apr 2020 16:00) (94% - 100%)    FH: Non-contributory  Social Hx: Non-contributory    TESTS & MEASUREMENTS:                        9.4    9.68  )-----------( 438      ( 27 Apr 2020 04:30 )             29.2     04-27    140  |  99  |  31<H>  ----------------------------<  188<H>  3.9   |  31  |  0.8    Ca    7.8<L>      27 Apr 2020 04:30  Mg     2.7     04-27    TPro  5.2<L>  /  Alb  2.8<L>  /  TBili  <0.2  /  DBili  x   /  AST  51<H>  /  ALT  42<H>  /  AlkPhos  92  04-26    eGFR if Non African American: 89 mL/min/1.73M2 (04-27-20 @ 04:30)  eGFR if African American: 103 mL/min/1.73M2 (04-27-20 @ 04:30)    LIVER FUNCTIONS - ( 26 Apr 2020 04:30 )  Alb: 2.8 g/dL / Pro: 5.2 g/dL / ALK PHOS: 92 U/L / ALT: 42 U/L / AST: 51 U/L / GGT: x               Culture - Blood (collected 04-23-20 @ 18:50)  Source: .Blood Blood  Preliminary Report (04-25-20 @ 03:01):    No growth to date.    Culture - Blood (collected 04-23-20 @ 17:28)  Source: .Blood Blood  Preliminary Report (04-25-20 @ 01:02):    No growth to date.        Lactate, Blood: 1.5 mmol/L (04-23-20 @ 14:30)      INFECTIOUS DISEASES TESTING  Procalcitonin, Serum: 0.40 (04-25-20 @ 06:23)  Streptococcus Pneumoniae Ag Urine: Negative (04-24-20 @ 18:41)  Legionella Antigen, Urine: Negative (04-24-20 @ 18:41)  COVID-19 PCR: Detected (04-23-20 @ 15:00)  Procalcitonin, Serum: 0.30 (04-23-20 @ 14:30)      INFLAMMATORY MARKERS  C-Reactive Protein, Serum: 18.05 mg/dL (04-25-20 @ 06:23)  C-Reactive Protein, Serum: 20.37 mg/dL (04-23-20 @ 14:30)      RADIOLOGY & ADDITIONAL TESTS:  I have personally reviewed the last available Chest xray  CXR      CT      CARDIOLOGY TESTING  12 Lead ECG:   Ventricular Rate 90 BPM    Atrial Rate 90 BPM    P-R Interval 148 ms    QRS Duration 100 ms    Q-T Interval 394 ms    QTC Calculation(Bezet) 481 ms    P Axis 38 degrees    R Axis 31 degrees    T Axis 38 degrees    Diagnosis Line *** Poor data quality, interpretation may be adversely affected  Normal sinus rhythm  Incomplete right bundle branch block  Prolonged QT  Abnormal ECG    Confirmed by LENKA SAUL MD [id number]) on 4/24/2020 9:17:35 AM (04-24-20 @ 09:02)  12 Lead ECG:   Ventricular Rate 91 BPM    Atrial Rate 91 BPM    P-R Interval 142 ms    QRS Duration 96 ms    Q-T Interval 302 ms    QTC Calculation(Bezet) 371 ms    P Axis 31 degrees    R Axis 10 degrees    T Axis 32 degrees    Diagnosis Line Normal sinus rhythm  Incomplete right bundle branch block  Cannot rule out Anterior infarct , age undetermined  Abnormal ECG    Confirmed by Augustin Jara ([id number]) on 4/23/2020 7:39:40 PM (04-23-20 @ 18:27)      MEDICATIONS  aspirin  chewable 81  cefTRIAXone   IVPB 1000  chlorhexidine 4% Liquid 1  colchicine 0.6  enoxaparin Injectable 40  famotidine    Tablet 20  hydroxychloroquine   hydroxychloroquine 400  insulin lispro (HumaLOG) corrective regimen sliding scale   lactated ringers. 1000  methylPREDNISolone sodium succinate Injectable 60      WEIGHT  Weight (kg): 77.4 (04-25-20 @ 11:57)  Creatinine, Serum: 0.8 mg/dL (04-27-20 @ 04:30)      ANTIBIOTICS:  cefTRIAXone   IVPB 1000 milliGRAM(s) IV Intermittent every 24 hours  hydroxychloroquine   Oral   hydroxychloroquine 400 milliGRAM(s) Oral every 24 hours      All available historical records have been reviewed

## 2020-04-27 NOTE — PROGRESS NOTE ADULT - SUBJECTIVE AND OBJECTIVE BOX
Patient is a 44y old  Female who presents with a chief complaint of pneumonia (27 Apr 2020 08:22)      Overnight events     Review of Systems     VITAL SIGNS     ICU Vital Signs Last 24 Hrs  T(C): 36.7 (27 Apr 2020 12:00), Max: 37.2 (26 Apr 2020 20:00)  T(F): 98 (27 Apr 2020 12:00), Max: 98.9 (26 Apr 2020 20:00)  HR: 94 (27 Apr 2020 12:00) (64 - 94)  BP: 117/71 (27 Apr 2020 12:00) (93/63 - 117/71)  BP(mean): 88 (27 Apr 2020 12:00) (72 - 88)  ABP: --  ABP(mean): --  RR: 34 (27 Apr 2020 12:00) (24 - 39)  SpO2: 94% (27 Apr 2020 12:00) (94% - 100%)      I&O's Detail    26 Apr 2020 07:01  -  27 Apr 2020 07:00  --------------------------------------------------------  IN:    IV PiggyBack: 100 mL    Oral Fluid: 120 mL  Total IN: 220 mL    OUT:    Voided: 1650 mL  Total OUT: 1650 mL    Total NET: -1430 mL      27 Apr 2020 07:01  -  27 Apr 2020 13:27  --------------------------------------------------------  IN:    Oral Fluid: 420 mL  Total IN: 420 mL    OUT:    Voided: 350 mL  Total OUT: 350 mL    Total NET: 70 mL          Physical Exam    GENERAL: NAD, well-developed  HEAD:  Atraumatic, Normocephalic  EYES: EOMI, PERRLA, conjunctiva and sclera clear  NECK: Supple, No JVD  CHEST/LUNG: Clear to auscultation bilaterally; No wheeze  HEART: Regular rate and rhythm; No murmurs, rubs, or gallops  ABDOMEN: Soft, Nontender, Nondistended; Bowel sounds present  EXTREMITIES:  2+ Peripheral Pulses, No clubbing, cyanosis, or edema  PSYCH:   NEUROLOGY: non-focal  SKIN: No rashes or lesions    CAPILLARY BLOOD GLUCOSE      POCT Blood Glucose.: 236 mg/dL (27 Apr 2020 11:42)  POCT Blood Glucose.: 185 mg/dL (27 Apr 2020 08:20)  POCT Blood Glucose.: 170 mg/dL (26 Apr 2020 21:59)  POCT Blood Glucose.: 192 mg/dL (26 Apr 2020 16:31)          LABS                          9.4    9.68  )-----------( 438      ( 27 Apr 2020 04:30 )             29.2       04-27    140  |  99  |  31<H>  ----------------------------<  188<H>  3.9   |  31  |  0.8    Ca    7.8<L>      27 Apr 2020 04:30  Mg     2.7     04-27    TPro  5.2<L>  /  Alb  2.8<L>  /  TBili  <0.2  /  DBili  x   /  AST  51<H>  /  ALT  42<H>  /  AlkPhos  92  04-26      LIVER FUNCTIONS - ( 26 Apr 2020 04:30 )  Alb: 2.8 g/dL / Pro: 5.2 g/dL / ALK PHOS: 92 U/L / ALT: 42 U/L / AST: 51 U/L / GGT: x                                     MEDS    MEDICATIONS  (STANDING):  aspirin  chewable 81 milliGRAM(s) Oral daily  cefTRIAXone   IVPB 1000 milliGRAM(s) IV Intermittent every 24 hours  chlorhexidine 4% Liquid 1 Application(s) Topical <User Schedule>  colchicine 0.6 milliGRAM(s) Oral two times a day  enoxaparin Injectable 40 milliGRAM(s) SubCutaneous two times a day  famotidine    Tablet 20 milliGRAM(s) Oral two times a day  hydroxychloroquine   Oral   hydroxychloroquine 400 milliGRAM(s) Oral every 24 hours  insulin lispro (HumaLOG) corrective regimen sliding scale   SubCutaneous three times a day before meals  lactated ringers. 1000 milliLiter(s) (75 mL/Hr) IV Continuous <Continuous>  methylPREDNISolone sodium succinate Injectable 60 milliGRAM(s) IV Push every 12 hours    MEDICATIONS  (PRN):  acetaminophen   Tablet .. 650 milliGRAM(s) Oral every 6 hours PRN Temp greater or equal to 38C (100.4F)  diphenhydrAMINE   Injectable 25 milliGRAM(s) IV Push once PRN Allergy symptoms  guaifenesin/dextromethorphan  Syrup 10 milliLiter(s) Oral every 6 hours PRN Cough      Radiology :    CXR   CT

## 2020-04-27 NOTE — PROGRESS NOTE ADULT - SUBJECTIVE AND OBJECTIVE BOX
OVERNIGHT EVENTS: events noted, ON [facility] 50/50, feels better, s.p toci    Vital Signs Last 24 Hrs  T(C): 36.6 (27 Apr 2020 04:00), Max: 37.2 (26 Apr 2020 12:00)  T(F): 97.8 (27 Apr 2020 04:00), Max: 98.9 (26 Apr 2020 12:00)  HR: 68 (27 Apr 2020 06:00) (64 - 90)  BP: 93/63 (27 Apr 2020 06:00) (93/63 - 105/68)  BP(mean): 72 (27 Apr 2020 06:00) (69 - 80)  RR: 28 (27 Apr 2020 06:00) (24 - 35)  SpO2: 99% (27 Apr 2020 06:00) (97% - 100%)    PHYSICAL EXAMINATION:    GENERAL: awake, speaking full sentences    HEENT: Head is normocephalic and atraumatic. Extraocular muscles are intact. Mucous membranes are moist.    NECK: Supple.    LUNGS: bl crackles    HEART: Regular rate and rhythm without murmur.    ABDOMEN: Soft, nontender, and nondistended.      EXTREMITIES: Without any cyanosis, clubbing, rash, lesions or edema.    NEUROLOGIC: Grossly intact.    SKIN: No ulceration or induration present.      LABS:                        9.4    9.68  )-----------( 438      ( 27 Apr 2020 04:30 )             29.2     04-27    140  |  99  |  31<H>  ----------------------------<  188<H>  3.9   |  31  |  0.8    Ca    7.8<L>      27 Apr 2020 04:30  Mg     2.7     04-27    TPro  5.2<L>  /  Alb  2.8<L>  /  TBili  <0.2  /  DBili  x   /  AST  51<H>  /  ALT  42<H>  /  AlkPhos  92  04-26                    Procalcitonin, Serum: 0.40 ng/mL (04-25-20 @ 06:23)        04-26-20 @ 07:01  -  04-27-20 @ 07:00  --------------------------------------------------------  IN: 220 mL / OUT: 1650 mL / NET: -1430 mL        MICROBIOLOGY:  Culture Results:   No growth to date. (04-23 @ 18:50)  Culture Results:   No growth to date. (04-23 @ 17:28)      MEDICATIONS  (STANDING):  cefTRIAXone   IVPB 1000 milliGRAM(s) IV Intermittent every 24 hours  chlorhexidine 4% Liquid 1 Application(s) Topical <User Schedule>  colchicine 0.6 milliGRAM(s) Oral two times a day  enoxaparin Injectable 40 milliGRAM(s) SubCutaneous two times a day  hydroxychloroquine   Oral   hydroxychloroquine 400 milliGRAM(s) Oral every 24 hours  insulin lispro (HumaLOG) corrective regimen sliding scale   SubCutaneous three times a day before meals  lactated ringers. 1000 milliLiter(s) (75 mL/Hr) IV Continuous <Continuous>  methylPREDNISolone sodium succinate Injectable 60 milliGRAM(s) IV Push every 12 hours    MEDICATIONS  (PRN):  acetaminophen   Tablet .. 650 milliGRAM(s) Oral every 6 hours PRN Temp greater or equal to 38C (100.4F)  guaifenesin/dextromethorphan  Syrup 10 milliLiter(s) Oral every 6 hours PRN Cough      RADIOLOGY & ADDITIONAL STUDIES:

## 2020-04-28 LAB
ALBUMIN SERPL ELPH-MCNC: 2.6 G/DL — LOW (ref 3.5–5.2)
ALP SERPL-CCNC: 77 U/L — SIGNIFICANT CHANGE UP (ref 30–115)
ALT FLD-CCNC: 52 U/L — HIGH (ref 0–41)
ANION GAP SERPL CALC-SCNC: 13 MMOL/L — SIGNIFICANT CHANGE UP (ref 7–14)
AST SERPL-CCNC: 48 U/L — HIGH (ref 0–41)
BILIRUB SERPL-MCNC: <0.2 MG/DL — SIGNIFICANT CHANGE UP (ref 0.2–1.2)
BUN SERPL-MCNC: 27 MG/DL — HIGH (ref 10–20)
CALCIUM SERPL-MCNC: 7.4 MG/DL — LOW (ref 8.5–10.1)
CHLORIDE SERPL-SCNC: 98 MMOL/L — SIGNIFICANT CHANGE UP (ref 98–110)
CO2 SERPL-SCNC: 29 MMOL/L — SIGNIFICANT CHANGE UP (ref 17–32)
CREAT SERPL-MCNC: 0.8 MG/DL — SIGNIFICANT CHANGE UP (ref 0.7–1.5)
CRP SERPL-MCNC: 2.23 MG/DL — HIGH (ref 0–0.4)
FERRITIN SERPL-MCNC: 252 NG/ML — HIGH (ref 15–150)
GLUCOSE BLDC GLUCOMTR-MCNC: 133 MG/DL — HIGH (ref 70–99)
GLUCOSE BLDC GLUCOMTR-MCNC: 143 MG/DL — HIGH (ref 70–99)
GLUCOSE BLDC GLUCOMTR-MCNC: 155 MG/DL — HIGH (ref 70–99)
GLUCOSE BLDC GLUCOMTR-MCNC: 161 MG/DL — HIGH (ref 70–99)
GLUCOSE SERPL-MCNC: 195 MG/DL — HIGH (ref 70–99)
HCT VFR BLD CALC: 29.6 % — LOW (ref 37–47)
HGB BLD-MCNC: 9.4 G/DL — LOW (ref 12–16)
MCHC RBC-ENTMCNC: 24.5 PG — LOW (ref 27–31)
MCHC RBC-ENTMCNC: 31.8 G/DL — LOW (ref 32–37)
MCV RBC AUTO: 77.3 FL — LOW (ref 81–99)
NRBC # BLD: 0 /100 WBCS — SIGNIFICANT CHANGE UP (ref 0–0)
PLATELET # BLD AUTO: 525 K/UL — HIGH (ref 130–400)
POTASSIUM SERPL-MCNC: 3.9 MMOL/L — SIGNIFICANT CHANGE UP (ref 3.5–5)
POTASSIUM SERPL-SCNC: 3.9 MMOL/L — SIGNIFICANT CHANGE UP (ref 3.5–5)
PROT SERPL-MCNC: 4.9 G/DL — LOW (ref 6–8)
RBC # BLD: 3.83 M/UL — LOW (ref 4.2–5.4)
RBC # FLD: 15.9 % — HIGH (ref 11.5–14.5)
SODIUM SERPL-SCNC: 140 MMOL/L — SIGNIFICANT CHANGE UP (ref 135–146)
WBC # BLD: 11.57 K/UL — HIGH (ref 4.8–10.8)
WBC # FLD AUTO: 11.57 K/UL — HIGH (ref 4.8–10.8)

## 2020-04-28 PROCEDURE — 71045 X-RAY EXAM CHEST 1 VIEW: CPT | Mod: 26

## 2020-04-28 PROCEDURE — 99232 SBSQ HOSP IP/OBS MODERATE 35: CPT

## 2020-04-28 RX ADMIN — FAMOTIDINE 20 MILLIGRAM(S): 10 INJECTION INTRAVENOUS at 04:49

## 2020-04-28 RX ADMIN — ENOXAPARIN SODIUM 40 MILLIGRAM(S): 100 INJECTION SUBCUTANEOUS at 04:50

## 2020-04-28 RX ADMIN — Medication 60 MILLIGRAM(S): at 04:49

## 2020-04-28 RX ADMIN — Medication 0.6 MILLIGRAM(S): at 04:49

## 2020-04-28 RX ADMIN — Medication 60 MILLIGRAM(S): at 17:31

## 2020-04-28 RX ADMIN — FAMOTIDINE 20 MILLIGRAM(S): 10 INJECTION INTRAVENOUS at 17:32

## 2020-04-28 RX ADMIN — Medication 1: at 11:51

## 2020-04-28 RX ADMIN — Medication 81 MILLIGRAM(S): at 11:51

## 2020-04-28 RX ADMIN — CHLORHEXIDINE GLUCONATE 1 APPLICATION(S): 213 SOLUTION TOPICAL at 04:49

## 2020-04-28 RX ADMIN — Medication 0.6 MILLIGRAM(S): at 20:19

## 2020-04-28 RX ADMIN — ENOXAPARIN SODIUM 40 MILLIGRAM(S): 100 INJECTION SUBCUTANEOUS at 17:31

## 2020-04-28 NOTE — PROGRESS NOTE ADULT - SUBJECTIVE AND OBJECTIVE BOX
Medicine Progress Note    Patient is a 44y old  Female who presents with a chief complaint of pneumonia (28 Apr 2020 10:35)      SUBJECTIVE / OVERNIGHT EVENTS:    ADDITIONAL REVIEW OF SYSTEMS:    MEDICATIONS  (STANDING):  aspirin  chewable 81 milliGRAM(s) Oral daily  cefTRIAXone   IVPB 1000 milliGRAM(s) IV Intermittent every 24 hours  chlorhexidine 4% Liquid 1 Application(s) Topical <User Schedule>  colchicine 0.6 milliGRAM(s) Oral two times a day  enoxaparin Injectable 40 milliGRAM(s) SubCutaneous two times a day  famotidine    Tablet 20 milliGRAM(s) Oral two times a day  insulin lispro (HumaLOG) corrective regimen sliding scale   SubCutaneous three times a day before meals  lactated ringers. 1000 milliLiter(s) (75 mL/Hr) IV Continuous <Continuous>  methylPREDNISolone sodium succinate Injectable 60 milliGRAM(s) IV Push every 12 hours    MEDICATIONS  (PRN):  acetaminophen   Tablet .. 650 milliGRAM(s) Oral every 6 hours PRN Temp greater or equal to 38C (100.4F)  diphenhydrAMINE   Injectable 25 milliGRAM(s) IV Push once PRN Allergy symptoms  guaifenesin/dextromethorphan  Syrup 10 milliLiter(s) Oral every 6 hours PRN Cough    CAPILLARY BLOOD GLUCOSE      POCT Blood Glucose.: 155 mg/dL (28 Apr 2020 11:39)  POCT Blood Glucose.: 143 mg/dL (28 Apr 2020 06:34)  POCT Blood Glucose.: 168 mg/dL (27 Apr 2020 20:28)  POCT Blood Glucose.: 197 mg/dL (27 Apr 2020 16:21)    I&O's Summary    27 Apr 2020 07:01  -  28 Apr 2020 07:00  --------------------------------------------------------  IN: 2010 mL / OUT: 350 mL / NET: 1660 mL        PHYSICAL EXAM:  Vital Signs Last 24 Hrs  T(C): 36.7 (28 Apr 2020 08:00), Max: 37 (28 Apr 2020 00:00)  T(F): 98 (28 Apr 2020 08:00), Max: 98.6 (28 Apr 2020 00:00)  HR: 74 (28 Apr 2020 12:00) (66 - 88)  BP: 115/76 (28 Apr 2020 12:00) (106/61 - 135/76)  BP(mean): 101 (28 Apr 2020 12:00) (76 - 103)  RR: 24 (28 Apr 2020 12:00) (13 - 39)  SpO2: 96% (28 Apr 2020 12:00) (95% - 100%)  CONSTITUTIONAL: NAD, well-developed, well-groomed  ENMT: Moist oral mucosa, no pharyngeal injection or exudates; normal dentition  RESPIRATORY: Normal respiratory effort; lungs are clear to auscultation bilaterally  CARDIOVASCULAR: Regular rate and rhythm, normal S1 and S2, no murmur/rub/gallop; No lower extremity edema; Peripheral pulses are 2+ bilaterally  ABDOMEN: Nontender to palpation, normoactive bowel sounds, no rebound/guarding; No hepatosplenomegaly  PSYCH: A+O to person, place, and time; affect appropriate  NEUROLOGY: CN 2-12 are intact and symmetric; no gross sensory deficits   SKIN: No rashes; no palpable lesions    LABS:                        9.4    11.57 )-----------( 525      ( 28 Apr 2020 00:43 )             29.6     04-28    140  |  98  |  27<H>  ----------------------------<  195<H>  3.9   |  29  |  0.8    Ca    7.4<L>      28 Apr 2020 00:43  Mg     2.7     04-27    TPro  4.9<L>  /  Alb  2.6<L>  /  TBili  <0.2  /  DBili  x   /  AST  48<H>  /  ALT  52<H>  /  AlkPhos  77  04-28              COVID-19 PCR: Detected (23 Apr 2020 15:00)      RADIOLOGY & ADDITIONAL TESTS:  Imaging from Last 24 Hours:    Electrocardiogram/QTc Interval:    COORDINATION OF CARE:  Care Discussed with Consultants/Other Providers:

## 2020-04-28 NOTE — PROGRESS NOTE ADULT - ASSESSMENT
43 yo F with HTN presents with URI symptoms since 4/16 -  low grade fever, non-productive cough. Was swabbed for COVID-19 4/17 and then called and told it was neg. On 4/20 she was still having sxs so called her PCP who started her on augmentin    covid pneumonia  sepsis  transaminitis    colchicine continue  po steroid taper  change iv rocephin to po abx  fu fs  dvt proph for now

## 2020-04-28 NOTE — CHART NOTE - NSCHARTNOTEFT_GEN_A_CORE
Spoke to patient's son (Gorge Montoya [phone]). I informed the family that she is in CCU in stable condition. She was switched from high flow oxygen to nasal canula, which she is tolerating well.  She otherwise appears to be stable. Her vital signs are within normal limits, WBC is 9 and she has been afebrile. Her WBC did increase from 9.7 to 11.6 today, but she is still on antibiotics.  She has received immunomodulators (toci), on plaquenil, and clochicine.  Steroids have been stopped.  She is also on full dose anticoagulation.     She will likely be downgraded to [facility] today.      He had no questions. He expressed understanding of information given. I told him that he will receive a phone call in case of acute events, otherwise he will receive updates daily.

## 2020-04-28 NOTE — PROGRESS NOTE ADULT - ASSESSMENT
ASSESSMENT  45 yo F with HTN presents with URI symptoms since 4/16 -  low grade fever, non-productive cough. Was swabbed for COVID-19 4/17 and then called and told it was neg. On 4/20 she was still having sxs so called her PCP who started her on augmentin    IMPRESSION  #COVID-19 PNA     CXR with bilateral opacities , more consolidative opacity on the R    Procalcitonin, Serum: 0.30 ng/mL (04-23-20 @ 14:30)  #Sepsis on admission T>101F, Pulse>90 WBC>12  #Hyponatremia   #Transaminitis  AST  122<H>  /  ALT  77<H>  /  AlkPhos  162<H>    #Obesity BMI (kg/m2): 31.1  #QTC Calculation(Bezet) 371 ms  #Cytokine Release Syndrome   D-Dimer Assay, Quantitative: 382 ng/mL DDU (04-24-20 @ 11:25)  C-Reactive Protein, Serum: 20.37 mg/dL (04-23-20 @ 14:30)  Ferritin, Serum: 381 ng/mL (04-23-20 @ 14:30)      RECOMMENDATIONS  - s/p Tocilizumab 400mg x1  - Continue Ceftriaxone 1g q24h IV x 5-7 days  - Trial colchicine 0.6mg BID x 5 days, monitor Cr and for diarrhea (if diarrhea can decrease to daily)     Spectra [id number]

## 2020-04-28 NOTE — CHART NOTE - NSCHARTNOTEFT_GEN_A_CORE
43y/o F with PMH of HTN and Anemia presented to ED with URI sxs. Started on 4/16 - with low grade fever, non-productive cough. Was swabbed 4/17 and then called and told it was neg. On 4/20 she was still having sxs so called her PCP who started her on Augmentin. No lightheadness, abd pain, N/V/D. She did have decreased appetite and mild SOB which worsened with some chest tightness. Since then her sxs have persisted and worsened, a/w with inc fevers to T103 so presented to ED for evaluation.     In ED: Tmax 103.2,  > 99, /67, RR18, SpO2 87% on RA, 96% on 2L NC.     Patient desaturated on NC, transitioned to non-rebreather, saturating 96%. Started on solumedrol 60q12. Patient was then transferred to CCU from CEU saturating 90-95% on NRM. Patient started on Plaquenil 4/24 and completed. Patient has been stable on 3L NC and transferred to [facility] on 4/28.    Patient received to [facility] 5A in clinically stable condition. Patient is saturating 99% on 2.5 liters upon transfer. Pt has no complaints.         Gen: Alert, NAD, well appearing  Head: NC, AT, PERRL, EOMI, normal lids/conjunctiva  ENT: normal hearing, patent oropharynx without erythema/exudate, uvula midline  Neck: +supple, no tenderness/meningismus/JVD, +Trachea midline  Pulm: Bilateral BS, Breath sounds decreased   CV: RRR, no M/R/G, +dist pulses  Abd: soft, NT/ND, +BS, no hepatosplenomegaly  Mskel: no edema/erythema/cyanosis  Skin: no rash, warm/dry  Neuro: AAOx3, no sensory/motor deficits    04-28    140  |  98  |  27<H>  ----------------------------<  195<H>  3.9   |  29  |  0.8    Ca    7.4<L>      28 Apr 2020 00:43  Mg     2.7     04-27    TPro  4.9<L>  /  Alb  2.6<L>  /  TBili  <0.2  /  DBili  x   /  AST  48<H>  /  ALT  52<H>  /  AlkPhos  77  04-28                          9.4    11.57 )-----------( 525      ( 28 Apr 2020 00:43 )             29.6         ASSESSMENT  45 yo F with HTN presents with URI symptoms since 4/16 -  low grade fever, non-productive cough. Was swabbed for COVID-19 4/17 and then called and told it was neg. On 4/20 she was still having sxs so called her PCP who started her on Augmentin.    Plan  #COVID-19 PNA     CXR with bilateral opacities , more consolidative opacity on the R    Procalcitonin, Serum: 0.30 ng/mL (04-23-20 @ 14:30)  - Sepsis on admission T>101F, Pulse>90 WBC>12  - Completed Plaquenil and Azithromycin   - Hyponatremia   - Transaminitis  AST  122<H>  /  ALT  77<H>  /  AlkPhos  162<H>    - Obesity BMI (kg/m2): 31.1  - QTC Calculation(Bezet) 371 ms  - Cytokine Release Syndrome   D-Dimer Assay, Quantitative: 382 ng/mL DDU (04-24-20 @ 11:25)  C-Reactive Protein, Serum: 20.37 mg/dL (04-23-20 @ 14:30)  Ferritin, Serum: 381 ng/mL (04-23-20 @ 14:30)    - s/p Tocilizumab 400mg x1  - D/C Ceftriaxone 1g q24h IV, start PO Vantin 200 mg BID  - D/C IV Solumedrol, start Prednisone 40 mg BID and taper

## 2020-04-28 NOTE — PROGRESS NOTE ADULT - SUBJECTIVE AND OBJECTIVE BOX
Patient was seen and examined. Spoke with RN. Chart reviewed.  c/o fatigue and sob, decreased appetite,    No events overnight.  Vital Signs Last 24 Hrs  T(F): 98.4 (28 Apr 2020 14:50), Max: 98.6 (28 Apr 2020 00:00)  HR: 63 (28 Apr 2020 14:50) (63 - 88)  BP: 109/74 (28 Apr 2020 14:50) (106/61 - 135/76)  SpO2: 91% (28 Apr 2020 14:50) (91% - 100%)  MEDICATIONS  (STANDING):  aspirin  chewable 81 milliGRAM(s) Oral daily  chlorhexidine 4% Liquid 1 Application(s) Topical <User Schedule>  colchicine 0.6 milliGRAM(s) Oral two times a day  enoxaparin Injectable 40 milliGRAM(s) SubCutaneous two times a day  famotidine    Tablet 20 milliGRAM(s) Oral two times a day  insulin lispro (HumaLOG) corrective regimen sliding scale   SubCutaneous three times a day before meals  lactated ringers. 1000 milliLiter(s) (75 mL/Hr) IV Continuous <Continuous>  methylPREDNISolone sodium succinate Injectable 60 milliGRAM(s) IV Push every 12 hours    MEDICATIONS  (PRN):  acetaminophen   Tablet .. 650 milliGRAM(s) Oral every 6 hours PRN Temp greater or equal to 38C (100.4F)  diphenhydrAMINE   Injectable 25 milliGRAM(s) IV Push once PRN Allergy symptoms  guaifenesin/dextromethorphan  Syrup 10 milliLiter(s) Oral every 6 hours PRN Cough    Labs:                        9.4    11.57 )-----------( 525      ( 28 Apr 2020 00:43 )             29.6                         9.4    9.68  )-----------( 438      ( 27 Apr 2020 04:30 )             29.2     28 Apr 2020 00:43    140    |  98     |  27     ----------------------------<  195    3.9     |  29     |  0.8    27 Apr 2020 04:30    140    |  99     |  31     ----------------------------<  188    3.9     |  31     |  0.8      Ca    7.4        28 Apr 2020 00:43  Ca    7.8        27 Apr 2020 04:30  Mg     2.7       27 Apr 2020 04:30    TPro  4.9    /  Alb  2.6    /  TBili  <0.2   /  DBili  x      /  AST  48     /  ALT  52     /  AlkPhos  77     28 Apr 2020 00:43            Radiology:    General: comfortable, NAD  Neurology: A&Ox3, nonfocal  Head:  Normocephalic, atraumatic  ENT:  Mucosa moist, no ulcerations  Neck:  Supple, no JVD,   Skin: no breakdowns (as per RN)  Resp: secreased br  CV: RRR, S1S2,   GI: Soft, NT, bowel sounds  MS: No edema, + peripheral pulses, FROM all 4 extremity

## 2020-04-28 NOTE — PROGRESS NOTE ADULT - SUBJECTIVE AND OBJECTIVE BOX
OVERNIGHT EVENTS: events noted, feels better, on NC 3 L    Vital Signs Last 24 Hrs  T(C): 36.7 (28 Apr 2020 08:00), Max: 37 (28 Apr 2020 00:00)  T(F): 98 (28 Apr 2020 08:00), Max: 98.6 (28 Apr 2020 00:00)  HR: 80 (28 Apr 2020 08:00) (66 - 94)  BP: 114/71 (28 Apr 2020 08:00) (100/63 - 135/76)  BP(mean): 90 (28 Apr 2020 08:00) (76 - 103)  RR: 35 (28 Apr 2020 08:00) (13 - 39)  SpO2: 96% (28 Apr 2020 08:00) (94% - 100%)    PHYSICAL EXAMINATION:    GENERAL: looks comfortable    HEENT: Head is normocephalic and atraumatic. Extraocular muscles are intact. Mucous membranes are moist.    NECK: Supple.    LUNGS: bl crackles    HEART: Regular rate and rhythm without murmur.    ABDOMEN: Soft, nontender, and nondistended.      EXTREMITIES: Without any cyanosis, clubbing, rash, lesions or edema.    NEUROLOGIC: Grossly intact.    SKIN: No ulceration or induration present.      LABS:                        9.4    11.57 )-----------( 525      ( 28 Apr 2020 00:43 )             29.6     04-28    140  |  98  |  27<H>  ----------------------------<  195<H>  3.9   |  29  |  0.8    Ca    7.4<L>      28 Apr 2020 00:43  Mg     2.7     04-27    TPro  4.9<L>  /  Alb  2.6<L>  /  TBili  <0.2  /  DBili  x   /  AST  48<H>  /  ALT  52<H>  /  AlkPhos  77  04-28 04-27-20 @ 07:01  -  04-28-20 @ 07:00  --------------------------------------------------------  IN: 2010 mL / OUT: 350 mL / NET: 1660 mL        MICROBIOLOGY:      MEDICATIONS  (STANDING):  aspirin  chewable 81 milliGRAM(s) Oral daily  cefTRIAXone   IVPB 1000 milliGRAM(s) IV Intermittent every 24 hours  chlorhexidine 4% Liquid 1 Application(s) Topical <User Schedule>  colchicine 0.6 milliGRAM(s) Oral two times a day  enoxaparin Injectable 40 milliGRAM(s) SubCutaneous two times a day  famotidine    Tablet 20 milliGRAM(s) Oral two times a day  insulin lispro (HumaLOG) corrective regimen sliding scale   SubCutaneous three times a day before meals  lactated ringers. 1000 milliLiter(s) (75 mL/Hr) IV Continuous <Continuous>  methylPREDNISolone sodium succinate Injectable 60 milliGRAM(s) IV Push every 12 hours    MEDICATIONS  (PRN):  acetaminophen   Tablet .. 650 milliGRAM(s) Oral every 6 hours PRN Temp greater or equal to 38C (100.4F)  diphenhydrAMINE   Injectable 25 milliGRAM(s) IV Push once PRN Allergy symptoms  guaifenesin/dextromethorphan  Syrup 10 milliLiter(s) Oral every 6 hours PRN Cough      RADIOLOGY & ADDITIONAL STUDIES:

## 2020-04-28 NOTE — PROGRESS NOTE ADULT - SUBJECTIVE AND OBJECTIVE BOX
MAURISIO TRISTAN  44y, Female  Allergy: aspirin (Unknown)      LOS  5d    CHIEF COMPLAINT: pneumonia (28 Apr 2020 08:14)      INTERVAL EVENTS/HPI  - No acute events overnight  - T(F): , Max: 98.6 (04-28-20 @ 00:00)  - WBC Count: 11.57 (04-28-20 @ 00:43)  WBC Count: 9.68 (04-27-20 @ 04:30)     - Creatinine, Serum: 0.8 (04-28-20 @ 00:43)  Creatinine, Serum: 0.8 (04-27-20 @ 04:30)       VITALS:  T(F): 98, Max: 98.6 (04-28-20 @ 00:00)  HR: 80  BP: 114/71  RR: 35Vital Signs Last 24 Hrs  T(C): 36.7 (28 Apr 2020 08:00), Max: 37 (28 Apr 2020 00:00)  T(F): 98 (28 Apr 2020 08:00), Max: 98.6 (28 Apr 2020 00:00)  HR: 80 (28 Apr 2020 08:00) (66 - 94)  BP: 114/71 (28 Apr 2020 08:00) (106/61 - 135/76)  BP(mean): 90 (28 Apr 2020 08:00) (76 - 103)  RR: 35 (28 Apr 2020 08:00) (13 - 39)  SpO2: 96% (28 Apr 2020 08:00) (94% - 100%)    PHYSICAL EXAM:  Gen: on NC  Lungs: bilateral chest expansion  Neuro: nonfocal    FH: Non-contributory  Social Hx: Non-contributory    TESTS & MEASUREMENTS:                        9.4    11.57 )-----------( 525      ( 28 Apr 2020 00:43 )             29.6     04-28    140  |  98  |  27<H>  ----------------------------<  195<H>  3.9   |  29  |  0.8    Ca    7.4<L>      28 Apr 2020 00:43  Mg     2.7     04-27    TPro  4.9<L>  /  Alb  2.6<L>  /  TBili  <0.2  /  DBili  x   /  AST  48<H>  /  ALT  52<H>  /  AlkPhos  77  04-28    eGFR if Non African American: 89 mL/min/1.73M2 (04-28-20 @ 00:43)  eGFR if : 103 mL/min/1.73M2 (04-28-20 @ 00:43)    LIVER FUNCTIONS - ( 28 Apr 2020 00:43 )  Alb: 2.6 g/dL / Pro: 4.9 g/dL / ALK PHOS: 77 U/L / ALT: 52 U/L / AST: 48 U/L / GGT: x               Culture - Blood (collected 04-23-20 @ 18:50)  Source: .Blood Blood  Preliminary Report (04-25-20 @ 03:01):    No growth to date.    Culture - Blood (collected 04-23-20 @ 17:28)  Source: .Blood Blood  Preliminary Report (04-25-20 @ 01:02):    No growth to date.        Lactate, Blood: 1.5 mmol/L (04-23-20 @ 14:30)      INFECTIOUS DISEASES TESTING  Procalcitonin, Serum: 0.40 (04-25-20 @ 06:23)  Streptococcus Pneumoniae Ag Urine: Negative (04-24-20 @ 18:41)  Legionella Antigen, Urine: Negative (04-24-20 @ 18:41)  COVID-19 PCR: Detected (04-23-20 @ 15:00)  Procalcitonin, Serum: 0.30 (04-23-20 @ 14:30)      INFLAMMATORY MARKERS  C-Reactive Protein, Serum: 18.05 mg/dL (04-25-20 @ 06:23)  C-Reactive Protein, Serum: 20.37 mg/dL (04-23-20 @ 14:30)      RADIOLOGY & ADDITIONAL TESTS:  I have personally reviewed the last available Chest xray  CXR      CT      CARDIOLOGY TESTING  12 Lead ECG:   Ventricular Rate 90 BPM    Atrial Rate 90 BPM    P-R Interval 148 ms    QRS Duration 100 ms    Q-T Interval 394 ms    QTC Calculation(Bezet) 481 ms    P Axis 38 degrees    R Axis 31 degrees    T Axis 38 degrees    Diagnosis Line *** Poor data quality, interpretation may be adversely affected  Normal sinus rhythm  Incomplete right bundle branch block  Prolonged QT  Abnormal ECG    Confirmed by LENKA SAUL MD ([id number]) on 4/24/2020 9:17:35 AM (04-24-20 @ 09:02)  12 Lead ECG:   Ventricular Rate 91 BPM    Atrial Rate 91 BPM    P-R Interval 142 ms    QRS Duration 96 ms    Q-T Interval 302 ms    QTC Calculation(Bezet) 371 ms    P Axis 31 degrees    R Axis 10 degrees    T Axis 32 degrees    Diagnosis Line Normal sinus rhythm  Incomplete right bundle branch block  Cannot rule out Anterior infarct , age undetermined  Abnormal ECG    Confirmed by Augustin Jara ([id number]) on 4/23/2020 7:39:40 PM (04-23-20 @ 18:27)      MEDICATIONS  aspirin  chewable 81  cefTRIAXone   IVPB 1000  chlorhexidine 4% Liquid 1  colchicine 0.6  enoxaparin Injectable 40  famotidine    Tablet 20  insulin lispro (HumaLOG) corrective regimen sliding scale   lactated ringers. 1000  methylPREDNISolone sodium succinate Injectable 60      WEIGHT  Weight (kg): 77.4 (04-25-20 @ 11:57)  Creatinine, Serum: 0.8 mg/dL (04-28-20 @ 00:43)      ANTIBIOTICS:  cefTRIAXone   IVPB 1000 milliGRAM(s) IV Intermittent every 24 hours      All available historical records have been reviewed

## 2020-04-28 NOTE — PROGRESS NOTE ADULT - ASSESSMENT
IMPRESSION:    Acute hypoxemic Resp failure/ viral pneumonia/ COVID+ NC  HTN      PLAN:    CNS: avoid CNS depressant    HEENT:  Oral care    PULMONARY:  HOB @ 45 degrees,  NC, finish steroids    CARDIOVASCULAR: keep equal balance    GI: GI prophylaxis                                          Feeding as tolerated    RENAL:  F/u  lytes.  Correct as needed. accurate I/O    INFECTIOUS DISEASE: plaquenil, SP TOCI / colchicine, OOB LEANDRA CHAIR    HEMATOLOGICAL:  DVT prophylaxis. lovenox 40 q 12h    ENDOCRINE:  Follow up FS.  Insulin protocol if needed.    CODE STATUS: FULL CODE    DISPOSITION: downgrade to floor  guarded prognosis

## 2020-04-28 NOTE — CONSULT NOTE ADULT - CONSULT REQUESTED DATE/TIME
I spoke with patient.  Her psychiatrist, Dr. Cm, recently recommended a trial of Remeron 7.5 mg at bedtime.  This is for some increased anxiety symptoms she has been having.  Patient is very nervous about starting this medication and worries about potential side effects.  We discussed that it would be safe for her to try this medication with her other medications but that there is a likelihood she would have some level of fatigue and sedation.  Other side effects include increased appetite and constipation as possibilities.  Patient did not want to try Remeron at this time.  She asked me to cancel it at her pharmacy and to refill her Exelon.  I did contact pharmacy for patient in this regard.  If patient changes her mind we can try the Remeron.   24-Apr-2020 00:00

## 2020-04-29 LAB
CULTURE RESULTS: SIGNIFICANT CHANGE UP
CULTURE RESULTS: SIGNIFICANT CHANGE UP
GLUCOSE BLDC GLUCOMTR-MCNC: 122 MG/DL — HIGH (ref 70–99)
GLUCOSE BLDC GLUCOMTR-MCNC: 141 MG/DL — HIGH (ref 70–99)
GLUCOSE BLDC GLUCOMTR-MCNC: 143 MG/DL — HIGH (ref 70–99)
GLUCOSE BLDC GLUCOMTR-MCNC: 183 MG/DL — HIGH (ref 70–99)
SPECIMEN SOURCE: SIGNIFICANT CHANGE UP
SPECIMEN SOURCE: SIGNIFICANT CHANGE UP

## 2020-04-29 RX ORDER — CEFPODOXIME PROXETIL 100 MG
200 TABLET ORAL EVERY 12 HOURS
Refills: 0 | Status: DISCONTINUED | OUTPATIENT
Start: 2020-04-29 | End: 2020-04-30

## 2020-04-29 RX ADMIN — ENOXAPARIN SODIUM 40 MILLIGRAM(S): 100 INJECTION SUBCUTANEOUS at 16:52

## 2020-04-29 RX ADMIN — ENOXAPARIN SODIUM 40 MILLIGRAM(S): 100 INJECTION SUBCUTANEOUS at 05:14

## 2020-04-29 RX ADMIN — Medication 10 MILLILITER(S): at 05:17

## 2020-04-29 RX ADMIN — Medication 81 MILLIGRAM(S): at 16:52

## 2020-04-29 RX ADMIN — FAMOTIDINE 20 MILLIGRAM(S): 10 INJECTION INTRAVENOUS at 05:16

## 2020-04-29 RX ADMIN — Medication 0.6 MILLIGRAM(S): at 05:16

## 2020-04-29 RX ADMIN — FAMOTIDINE 20 MILLIGRAM(S): 10 INJECTION INTRAVENOUS at 16:52

## 2020-04-29 RX ADMIN — Medication 0.6 MILLIGRAM(S): at 16:52

## 2020-04-29 RX ADMIN — Medication 200 MILLIGRAM(S): at 16:52

## 2020-04-29 RX ADMIN — Medication 40 MILLIGRAM(S): at 05:16

## 2020-04-29 RX ADMIN — Medication 40 MILLIGRAM(S): at 16:53

## 2020-04-29 NOTE — PHYSICAL THERAPY INITIAL EVALUATION ADULT - IMPAIRMENTS CONTRIBUTING TO GAIT DEVIATIONS, PT EVAL
impaired balance/decreased endurance; SpO2: low of 87% with ambulation on RA; recovered to 94% sitting EOB

## 2020-04-29 NOTE — PROGRESS NOTE ADULT - SUBJECTIVE AND OBJECTIVE BOX
Medicine Progress Note    Patient is a 44y old  Female who presents with a chief complaint of pneumonia (28 Apr 2020 15:37)  45 y/o female examined at bedside. She is laying comfortably in bed. Chart was reviewed. Patient continues to complain of a non productive cough. Patient denies any chest pain, shortness of breath, fever, or chills.    SUBJECTIVE / OVERNIGHT EVENTS:  No overnight events.  SPO2 97% on 2L NC, titrated to 1L      MEDICATIONS  (STANDING):  aspirin  chewable 81 milliGRAM(s) Oral daily  chlorhexidine 4% Liquid 1 Application(s) Topical <User Schedule>  colchicine 0.6 milliGRAM(s) Oral two times a day  enoxaparin Injectable 40 milliGRAM(s) SubCutaneous two times a day  famotidine    Tablet 20 milliGRAM(s) Oral two times a day  insulin lispro (HumaLOG) corrective regimen sliding scale   SubCutaneous three times a day before meals  lactated ringers. 1000 milliLiter(s) (75 mL/Hr) IV Continuous <Continuous>  predniSONE   Tablet 40 milliGRAM(s) Oral every 12 hours    MEDICATIONS  (PRN):  acetaminophen   Tablet .. 650 milliGRAM(s) Oral every 6 hours PRN Temp greater or equal to 38C (100.4F)  diphenhydrAMINE   Injectable 25 milliGRAM(s) IV Push once PRN Allergy symptoms  guaifenesin/dextromethorphan  Syrup 10 milliLiter(s) Oral every 6 hours PRN Cough    CAPILLARY BLOOD GLUCOSE    POCT Blood Glucose.: 161 mg/dL (28 Apr 2020 20:30)  POCT Blood Glucose.: 133 mg/dL (28 Apr 2020 16:14)  POCT Blood Glucose.: 155 mg/dL (28 Apr 2020 11:39)    Review of Systems    Constitutional: (-) fever  Eyes/ENT: (-) blurry vision, (-) epistaxis  Cardiovascular: (-) chest pain, (-) syncope  Respiratory: (+) cough, (-) shortness of breath  Gastrointestinal: (-) vomiting, (-) diarrhea  Musculoskeletal: (-) neck pain, (-) back pain, (-) joint pain  Integumentary: (-) rash, (-) edema  Neurological: (-) headache, (-) altered mental status  Psychiatric: (-) hallucinations  Allergic/Immunologic: (-) pruritus    PHYSICAL EXAM:  Vital Signs Last 24 Hrs  T(C): 37.3 (29 Apr 2020 04:00), Max: 37.4 (28 Apr 2020 20:00)  T(F): 99.1 (29 Apr 2020 04:00), Max: 99.4 (28 Apr 2020 20:00)  HR: 78 (29 Apr 2020 04:00) (63 - 80)  BP: 125/83 (29 Apr 2020 04:00) (109/74 - 128/79)  BP(mean): 101 (28 Apr 2020 12:00) (90 - 101)  RR: 22 (29 Apr 2020 04:00) (18 - 35)  SpO2: 95% (29 Apr 2020 04:00) (90% - 96%)    Gen: Alert, NAD, well appearing  Head: NC, AT, PERRL, EOMI, normal lids/conjunctiva  ENT: normal hearing, patent oropharynx without erythema/exudate, uvula midline  Neck: +supple, no tenderness/meningismus/JVD, +Trachea midline  Pulm: Decreased breath sounds bilaterally, normal resp effort,   CV: RRR, no M/R/G, +dist pulses  Abd: soft, NT/ND, +BS, no hepatosplenomegaly  Mskel: no edema/erythema/cyanosis  Skin: no rash, warm/dry  Neuro: AAOx3, no sensory/motor deficits      LABS:                        9.4    11.57 )-----------( 525      ( 28 Apr 2020 00:43 )             29.6     04-28    140  |  98  |  27<H>  ----------------------------<  195<H>  3.9   |  29  |  0.8    Ca    7.4<L>      28 Apr 2020 00:43    TPro  4.9<L>  /  Alb  2.6<L>  /  TBili  <0.2  /  DBili  x   /  AST  48<H>  /  ALT  52<H>  /  AlkPhos  77  04-28      COVID-19 PCR: Detected (23 Apr 2020 15:00)      RADIOLOGY & ADDITIONAL TESTS:  EXAM:  XR CHEST PORTABLE ROUTINE 1V            PROCEDURE DATE:  04/28/2020            INTERPRETATION:  Clinical History/Reason for Exam:  coivd    Comparison: XR CHEST 4/27/2020 6:47 AM.      Findings:    Technique/Positioning:  Frontal portable radiograph of the chest.    Support devices:  Leads overlie thorax, obscuring anatomy    Cardiac/mediastinum/hilum: Stable    Lung parenchyma/ Pleura: Minimal decrease right lung opacity, stable left lung opacities. No pneumothorax      Skeleton/soft tissues: Stable      Impression:    Minimal decrease right lung opacity, stable left lung opacities. No pneumothorax        MARKO LORA M.D., ATTENDING RADIOLOGIST  This document has been electronically signed. Apr 28 2020  8:22AM        COORDINATION OF CARE:  Care Discussed with Consultants/Other Providers:

## 2020-04-29 NOTE — CHART NOTE - NSCHARTNOTEFT_GEN_A_CORE
Spoke with patient regarding contacting her family. Patient states that she has been in touch with them using her personal cell phone. Patient states she will let me know if she'd like for me to speak with them as the day progresses.

## 2020-04-29 NOTE — PROGRESS NOTE ADULT - ASSESSMENT
Assessment & Plan  45 yo F with HTN presents with URI symptoms since 4/16 -  low grade fever, non-productive cough. Was swabbed for COVID-19 4/17 and then called and told it was neg. On 4/20 she was still having sxs so called her PCP who started her on Augmentin.    Plan  #COVID-19 PNA     CXR with bilateral opacities , more consolidative opacity on the R    Procalcitonin, Serum: 0.30 ng/mL (04-23-20 @ 14:30)  - Sepsis on admission T>101F, Pulse>90 WBC>12  - Completed Plaquenil and Azithromycin   - Hyponatremia   - Transaminitis  AST  122<H>  /  ALT  77<H>  /  AlkPhos  162<H>    - Obesity BMI (kg/m2): 31.1  - QTC Calculation(Bezet) 371 ms  - Cytokine Release Syndrome   D-Dimer Assay, Quantitative: 382 ng/mL DDU (04-24-20 @ 11:25)  C-Reactive Protein, Serum: 20.37 mg/dL (04-23-20 @ 14:30)  Ferritin, Serum: 381 ng/mL (04-23-20 @ 14:30)    - s/p Tocilizumab 400mg x1  - D/C Ceftriaxone 1g q24h IV, start PO Vantin 200 mg BID  - D/C IV Solumedrol, start Prednisone 40 mg BID and taper.    #DVT prophylaxis  - Lovenox 40mg q 12h

## 2020-04-30 ENCOUNTER — TRANSCRIPTION ENCOUNTER (OUTPATIENT)
Age: 45
End: 2020-04-30

## 2020-04-30 VITALS — RESPIRATION RATE: 19 BRPM | OXYGEN SATURATION: 94 %

## 2020-04-30 LAB — GLUCOSE BLDC GLUCOMTR-MCNC: 136 MG/DL — HIGH (ref 70–99)

## 2020-04-30 RX ORDER — RIVAROXABAN 15 MG-20MG
1 KIT ORAL
Qty: 30 | Refills: 0
Start: 2020-04-30 | End: 2020-05-29

## 2020-04-30 RX ADMIN — FAMOTIDINE 20 MILLIGRAM(S): 10 INJECTION INTRAVENOUS at 05:02

## 2020-04-30 RX ADMIN — Medication 40 MILLIGRAM(S): at 05:02

## 2020-04-30 RX ADMIN — Medication 200 MILLIGRAM(S): at 05:02

## 2020-04-30 RX ADMIN — Medication 0.6 MILLIGRAM(S): at 05:02

## 2020-04-30 RX ADMIN — ENOXAPARIN SODIUM 40 MILLIGRAM(S): 100 INJECTION SUBCUTANEOUS at 05:03

## 2020-04-30 RX ADMIN — CHLORHEXIDINE GLUCONATE 1 APPLICATION(S): 213 SOLUTION TOPICAL at 05:04

## 2020-04-30 NOTE — DISCHARGE NOTE NURSING/CASE MANAGEMENT/SOCIAL WORK - PATIENT PORTAL LINK FT
You can access the FollowMyHealth Patient Portal offered by [facility] by registering at the following website: [email or web address]. By joining [employer]’s FollowMyHealth portal, you will also be able to view your health information using other applications (apps) compatible with our system.

## 2020-04-30 NOTE — DISCHARGE NOTE PROVIDER - NSDCFUADDINST_GEN_ALL_CORE_FT
The symptoms you have been experiencing are due to infection with the novel coronavirus and subsequent development of COVID-19. Symptoms of the illness include fever, malaise, dry cough, and occasionally diarrhea, headache, loss of appetite, and disturbances in taste/smell. The virus is spread through respiratory droplets and contact with contaminated items/surfaces.    Your oxygen requirements have improved such that you do not require supplemental oxygen on ambulation. You are currently medically cleared for discharge. Please continue to self-quarantine for another 7 days. Wash your hands thoroughly (for at least 20 seconds with soap and warm water) and frequently. Avoid touching your face and cover your cough/sneeze. Wear a face mask if going outside and avoid close contact with others for at least 7 days.    If you experience any worsening or recurrence of your symptoms, particularly worsening or high fever, shortness of breath, extreme fatigue, bloody cough, chest pain, palpitations, if you are unable to keep down food/fluids, or if you experience dizziness, or fainting, please call 9-1-1 immediately or report to the nearest Emergency Department. If you have any questions or concerns, please do not hesitate to call the hospital at [phone].    Please also note that you are being discharged with a prescription for Xarelto to reduce the risk of blood clots. Please take Xarelto 10mg daily for 30 days. Please seek immediate medical care by calling 911 and going to the hospital if you develop blood in stools or other signs of bleeding.

## 2020-04-30 NOTE — DISCHARGE NOTE PROVIDER - NSDCCPCAREPLAN_GEN_ALL_CORE_FT
PRINCIPAL DISCHARGE DIAGNOSIS  Diagnosis: COVID-19  Assessment and Plan of Treatment:       SECONDARY DISCHARGE DIAGNOSES  Diagnosis: Viral illness  Assessment and Plan of Treatment:

## 2020-04-30 NOTE — DISCHARGE NOTE PROVIDER - PROVIDER TOKENS
FREE:[LAST:[Donta],FIRST:[Kiki],PHONE:[phone]],FAX:[(   )    -],ADDRESS:[[address] [place] [address]],FOLLOWUP:[2 weeks]]

## 2020-04-30 NOTE — DISCHARGE NOTE PROVIDER - NSDCMRMEDTOKEN_GEN_ALL_CORE_FT
amLODIPine 10 mg oral tablet: 1 tab(s) orally once a day  chlorthalidone 25 mg oral tablet: 1 tab(s) orally once a day  predniSONE 10 mg oral tablet: 1 tab(s) orally once a day   predniSONE 20 mg oral tablet: 2 tab(s) orally once a day   predniSONE 20 mg oral tablet: 1 tab(s) orally once a day   Xarelto 10 mg oral tablet: 1 tab(s) orally once a day

## 2020-04-30 NOTE — PROGRESS NOTE ADULT - REASON FOR ADMISSION
COVID-19
pneumonia
COVID 19
COVID 19
pneumonia

## 2020-04-30 NOTE — PROGRESS NOTE ADULT - ASSESSMENT
Assessment & Plan  43 yo F with HTN presents with URI symptoms since 4/16 -  low grade fever, non-productive cough. Was swabbed for COVID-19 4/17 and then called and told it was neg. On 4/20 she was still having sxs so called her PCP who started her on Augmentin.    Plan  #COVID-19 PNA     CXR with bilateral opacities , more consolidative opacity on the R  - Sepsis on admission T>101F, Pulse>90 WBC>12  - Completed Plaquenil and Azithromycin  - Obesity BMI (kg/m2): 31.1  - Cytokine Release Syndrome   - Stable, SPO2 95% on RA  D-Dimer Assay, Quantitative: 382 ng/mL DDU (04-24-20 @ 11:25)  C-Reactive Protein, Serum: 20.37 mg/dL (04-23-20 @ 14:30)  Ferritin, Serum: 381 ng/mL (04-23-20 @ 14:30)    - s/p Tocilizumab 400mg x1  - Continue PO Vantin 200 mg BID  - Tapering Prednisone, Prednisone 40 mg BID x2 days, start Prednisone 40 mg qd starting 5/1    #DVT prophylaxis  - Lovenox 40mg q 12h    #Disposition  Plan for D/C today to home Assessment & Plan  43 yo F with HTN presents with URI symptoms since 4/16 -  low grade fever, non-productive cough. Was swabbed for COVID-19 4/17 and then called and told it was neg. On 4/20 she was still having sxs so called her PCP who started her on Augmentin.    Plan  #COVID-19 PNA     CXR with bilateral opacities , more consolidative opacity on the R  - Sepsis on admission T>101F, Pulse>90 WBC>12  - Completed Plaquenil and Azithromycin  - Obesity BMI (kg/m2): 31.1  - Cytokine Release Syndrome   - Stable, SPO2 95% on RA  D-Dimer Assay, Quantitative: 382 ng/mL DDU (04-24-20 @ 11:25)  C-Reactive Protein, Serum: 20.37 mg/dL (04-23-20 @ 14:30)  Ferritin, Serum: 381 ng/mL (04-23-20 @ 14:30)    - s/p Tocilizumab 400mg x1  - D/C PO Vantin 200 mg BID upon discharge  - Tapering Prednisone, Prednisone 40 mg BID x2 days, start Prednisone 40 mg qd starting 5/1    #DVT prophylaxis  - Lovenox 40mg q 12h    #Disposition  Plan for D/C today to home

## 2020-04-30 NOTE — DISCHARGE NOTE PROVIDER - CARE PROVIDER_API CALL
Kiki Longo  [address] [place] [address]  Phone: [phone]  Fax: (   )    -  Follow Up Time: 2 weeks

## 2020-04-30 NOTE — DISCHARGE NOTE PROVIDER - HOSPITAL COURSE
43y/o F with PMH of HTN and Anemia presented to ED with URI sxs. Started on 4/16 - with low grade fever, non-productive cough. Was swabbed 4/17 and then called and told it was neg. On 4/20 she was still having sxs so called her PCP who started her on Augmentin. No lightheadness, abd pain, N/V/D. She did have decreased appetite and mild SOB which worsened with some chest tightness. Since then her sxs have persisted and worsened, a/w with inc fevers to T103 so presented to ED for evaluation.         In ED: Tmax 103.2,  > 99, /67, RR18, SpO2 87% on RA, 96% on 2L NC.         Patient desaturated on NC, transitioned to non-rebreather, saturating 96%. Started on solumedrol 60q12. Patient was then transferred to CCU from CEU saturating 90-95% on NRM. Patient started on Plaquenil 4/24 and completed. Patient had been stable on 3L NC and transferred to [facility] on 4/28.        Patient is stable on RA today 4/30/2020 SPO2 95% on RA. She will be discharged home on Xarelto 10 mg BID and Prednisone taper.

## 2020-04-30 NOTE — PROGRESS NOTE ADULT - SUBJECTIVE AND OBJECTIVE BOX
Medicine Progress Note    Patient is a 44y old  Female who presents with a chief complaint of COVID 19 (29 Apr 2020 07:43)    43 y/o female examined at bedside. She is laying comfortably in bed. Chart was reviewed. Patient reports noticing swelling in her bilateral lowerextremities. Patient denies any chest pain, shortness of breath, fever, or chills.      SUBJECTIVE / OVERNIGHT EVENTS:  No overnight events.   SPO2 95% on RA.    MEDICATIONS  (STANDING):  aspirin  chewable 81 milliGRAM(s) Oral daily  cefpodoxime 200 milliGRAM(s) Oral every 12 hours  chlorhexidine 4% Liquid 1 Application(s) Topical <User Schedule>  colchicine 0.6 milliGRAM(s) Oral two times a day  enoxaparin Injectable 40 milliGRAM(s) SubCutaneous two times a day  famotidine    Tablet 20 milliGRAM(s) Oral two times a day  insulin lispro (HumaLOG) corrective regimen sliding scale   SubCutaneous three times a day before meals  lactated ringers. 1000 milliLiter(s) (75 mL/Hr) IV Continuous <Continuous>  predniSONE   Tablet 40 milliGRAM(s) Oral every 12 hours    MEDICATIONS  (PRN):  acetaminophen   Tablet .. 650 milliGRAM(s) Oral every 6 hours PRN Temp greater or equal to 38C (100.4F)  diphenhydrAMINE   Injectable 25 milliGRAM(s) IV Push once PRN Allergy symptoms  guaifenesin/dextromethorphan  Syrup 10 milliLiter(s) Oral every 6 hours PRN Cough    CAPILLARY BLOOD GLUCOSE      POCT Blood Glucose.: 136 mg/dL (30 Apr 2020 06:04)  POCT Blood Glucose.: 141 mg/dL (29 Apr 2020 20:25)  POCT Blood Glucose.: 143 mg/dL (29 Apr 2020 16:20)  POCT Blood Glucose.: 183 mg/dL (29 Apr 2020 11:41)    Review of Systems    Constitutional: (-) fever  Eyes/ENT: (-) blurry vision, (-) epistaxis  Cardiovascular: (-) chest pain, (-) syncope  Respiratory: (-) cough, (-) shortness of breath  Gastrointestinal: (-) vomiting, (-) diarrhea  Musculoskeletal: (-) neck pain, (-) back pain, (-) joint pain  Integumentary: (-) rash, (-) edema  Neurological: (-) headache, (-) altered mental status  Psychiatric: (-) hallucinations  Allergic/Immunologic: (-) pruritus    PHYSICAL EXAM:  Vital Signs Last 24 Hrs  T(C): 37 (30 Apr 2020 04:11), Max: 37.4 (30 Apr 2020 00:10)  T(F): 98.6 (30 Apr 2020 04:11), Max: 99.3 (30 Apr 2020 00:10)  HR: 68 (30 Apr 2020 04:11) (68 - 90)  BP: 120/81 (30 Apr 2020 04:11) (104/70 - 124/83)  RR: 19 (30 Apr 2020 07:56) (18 - 21)  SpO2: 94% (30 Apr 2020 07:56) (94% - 99%)    Gen: Alert, NAD, well appearing  Head: NC, AT, PERRL, EOMI, normal lids/conjunctiva  ENT: normal hearing, patent oropharynx without erythema/exudate, uvula midline  Neck: +supple, no tenderness/meningismus/JVD, +Trachea midline  Pulm: Bilateral BS, normal resp effort, no wheeze/stridor/retractions  CV: RRR, no M/R/G, +dist pulses  Abd: soft, NT/ND, +BS, no hepatosplenomegaly  Mskel: Edema noted on b/l lower extremities   Skin: no rash, warm/dry  Neuro: AAOx3, no sensory/motor deficits, CN 2-12 intact    COVID-19 PCR: Detected (23 Apr 2020 15:00)      COORDINATION OF CARE:  Care Discussed with Consultants/Other Providers:

## 2020-05-04 DIAGNOSIS — R74.0 NONSPECIFIC ELEVATION OF LEVELS OF TRANSAMINASE AND LACTIC ACID DEHYDROGENASE [LDH]: ICD-10-CM

## 2020-05-04 DIAGNOSIS — I10 ESSENTIAL (PRIMARY) HYPERTENSION: ICD-10-CM

## 2020-05-04 DIAGNOSIS — E87.1 HYPO-OSMOLALITY AND HYPONATREMIA: ICD-10-CM

## 2020-05-04 DIAGNOSIS — A41.89 OTHER SPECIFIED SEPSIS: ICD-10-CM

## 2020-05-04 DIAGNOSIS — N17.9 ACUTE KIDNEY FAILURE, UNSPECIFIED: ICD-10-CM

## 2020-05-04 DIAGNOSIS — E66.9 OBESITY, UNSPECIFIED: ICD-10-CM

## 2020-05-04 DIAGNOSIS — J96.01 ACUTE RESPIRATORY FAILURE WITH HYPOXIA: ICD-10-CM

## 2020-05-04 DIAGNOSIS — U07.1 COVID-19: ICD-10-CM

## 2020-05-04 DIAGNOSIS — D64.9 ANEMIA, UNSPECIFIED: ICD-10-CM

## 2020-05-04 DIAGNOSIS — E87.2 ACIDOSIS: ICD-10-CM

## 2020-05-04 DIAGNOSIS — J18.9 PNEUMONIA, UNSPECIFIED ORGANISM: ICD-10-CM

## 2020-05-04 DIAGNOSIS — J12.9 VIRAL PNEUMONIA, UNSPECIFIED: ICD-10-CM

## 2020-05-15 ENCOUNTER — TRANSCRIPTION ENCOUNTER (OUTPATIENT)
Age: 45
End: 2020-05-15

## 2020-12-02 NOTE — PHYSICAL THERAPY INITIAL EVALUATION ADULT - GAIT DEVIATIONS NOTED, PT EVAL
Gage Smith  MEDICINE - [facility] PRACT - [address], [facility] [address]  [place] [address]  Phone: [phone]  Fax: [phone]  Follow Up Time: 1-3 Days   decreased step length/decreased ezio

## 2021-04-28 PROBLEM — I10 ESSENTIAL (PRIMARY) HYPERTENSION: Chronic | Status: ACTIVE | Noted: 2020-04-23

## 2021-06-10 NOTE — PHYSICAL THERAPY INITIAL EVALUATION ADULT - THERAPY FREQUENCY, PT EVAL
1-2 more sessions as needed
Bilirubin 2.4  Baseline 0.4  Will get total and direct bili in the morning   If continues to trend, will get Abdominal ultrasound

## 2021-06-16 ENCOUNTER — APPOINTMENT (OUTPATIENT)
Dept: OBGYN | Facility: CLINIC | Age: 46
End: 2021-06-16
Payer: COMMERCIAL

## 2021-06-16 ENCOUNTER — OUTPATIENT (OUTPATIENT)
Dept: OUTPATIENT SERVICES | Facility: HOSPITAL | Age: 46
LOS: 1 days | Discharge: HOME | End: 2021-06-16

## 2021-06-16 VITALS — DIASTOLIC BLOOD PRESSURE: 70 MMHG | WEIGHT: 179 LBS | SYSTOLIC BLOOD PRESSURE: 100 MMHG | BODY MASS INDEX: 33.82 KG/M2

## 2021-06-16 DIAGNOSIS — Z01.419 ENCOUNTER FOR GYNECOLOGICAL EXAMINATION (GENERAL) (ROUTINE) W/OUT ABNORMAL FINDINGS: ICD-10-CM

## 2021-06-16 PROCEDURE — 99396 PREV VISIT EST AGE 40-64: CPT

## 2021-11-16 NOTE — ED PROVIDER NOTE - CADM POA URETHRAL CATHETER
Move amlodipine 5 mg to the evening and atorvastatin 80 mg to the evening.    Take HCTZ 25 mg in morning.    BP goal <130/80 consistently.    Check your BP cuff, BP cuff sizing, sit still for 15 minutes, check batteries, and communicate with your PCP if readings still high to check cuff.    If remains elevated, recommend switching CCB to ACE inhibitor (consider lisinopril 10 mg) or increase amlodipine to 10 mg once in evening.              
No

## 2022-04-14 NOTE — ED ADULT NURSE NOTE - CAS EDN DISCHARGE INTERVENTIONS
Pt tolerated Cycle 6 Taxol/Carbo infusion well.  No adverse reaction noted. No complaints at this time. PAD flushed with NS and de-accessed per protocol. Reviewed s/s to monitor and when to report to Dr. Thomas. Chemo calendar printed and reviewed. Pt verbalized understanding and left clinic in no acute distress.   IV discontinued, cath removed intact

## 2022-06-08 NOTE — ED PROVIDER NOTE - CLINICAL SUMMARY MEDICAL DECISION MAKING FREE TEXT BOX
viral illness. c/w covid. leukocytosis. cxr, labs and dispo viral illness. c/w covid. leukocytosis. cxr with pna.  r/o covid, labs reviewed. admit (3) Not Aware of Limitations

## 2022-11-11 NOTE — PROGRESS NOTE ADULT - PROVIDER SPECIALTY LIST ADULT
Additional History: Patient would like Belotero for under eyes advise her of price. Also she would like filler to the NLF and [place]. Numbing was applied to the areas.  Patient would also want treatment to the glabella ( she defers botulinum) Critical Care

## 2023-09-22 NOTE — CONSULT NOTE ADULT - CONSULT REASON
PNA
SOB
Pt is A&OX4, ambulatory. Complaining of weakness, cough, SOB, headache, fever since yesterday. States taking ibuprofen last night. Denies chest pain, difficulty breathing and, N/V/D. Complaining of back pain when coughing. No distress noted. pmh htn

## 2024-06-13 ENCOUNTER — APPOINTMENT (OUTPATIENT)
Dept: ORTHOPEDIC SURGERY | Facility: CLINIC | Age: 49
End: 2024-06-13
Payer: COMMERCIAL

## 2024-06-13 DIAGNOSIS — M17.12 UNILATERAL PRIMARY OSTEOARTHRITIS, LEFT KNEE: ICD-10-CM

## 2024-06-13 PROCEDURE — 99203 OFFICE O/P NEW LOW 30 MIN: CPT

## 2024-06-13 PROCEDURE — 73560 X-RAY EXAM OF KNEE 1 OR 2: CPT | Mod: 50

## 2024-06-14 PROBLEM — M17.12 ARTHRITIS OF LEFT KNEE: Status: ACTIVE | Noted: 2024-06-14

## 2024-06-14 NOTE — HISTORY OF PRESENT ILLNESS
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 Notes difficulty with ADL's secondary to knee pain. Pain related to activity but only partially improved with rest, medications, modalities.  Past Medical History is unchanged, all medical issues and medications are stable.  Review of systems is negative for fevers, chills, chest pain, shortness of breath, unexplained weight fluctuations, GI or  symptoms.  Smoking history and social habits are unchanged.  Left Knee: JLT noted, guarding with ROM, decreased quad extension strength, no contracture, no gross instability.   Imaging: X-rays were reviewed with the patient.   AP and Lateral views were obtained of the knees, including the contralateral side for comparison purposes. X-rays are negative for acute bone or soft tissue trauma. left knee degenerative changes most pronounced in PF joint.  Impression is left knee pain likely do to degenerative changes and internal derangement.  Plan for left knee injection  Prior to injection, risks and benefits of the procedure were discussed, including but not limited to pain, swelling, failure to improve symptoms and in rare cases infection or allergic reaction.  The left knee was prepped and draped with betadine and alcohol.  Using sterile technique 1cc of 40mg/cc kenalog solution mixed with 4cc of 1% lidocaine was injected thru an anterolateral portal using a 22guage needle.  Upon withdrawing the needle pressure was applied with to the injection site for approximately 1 minute.  Once hemostasis was achieved a band-aid dressing was applied.  The patient tolerated the procedure well.  followup in 4-6 months or as needed.

## 2025-03-17 ENCOUNTER — APPOINTMENT (OUTPATIENT)
Age: 50
End: 2025-03-17

## 2025-05-12 ENCOUNTER — APPOINTMENT (OUTPATIENT)
Dept: OBGYN | Facility: CLINIC | Age: 50
End: 2025-05-12
Payer: COMMERCIAL

## 2025-05-12 ENCOUNTER — OUTPATIENT (OUTPATIENT)
Dept: OUTPATIENT SERVICES | Facility: HOSPITAL | Age: 50
LOS: 1 days | End: 2025-05-12

## 2025-05-12 ENCOUNTER — NON-APPOINTMENT (OUTPATIENT)
Age: 50
End: 2025-05-12

## 2025-05-12 ENCOUNTER — OUTPATIENT (OUTPATIENT)
Dept: OUTPATIENT SERVICES | Facility: HOSPITAL | Age: 50
LOS: 1 days | End: 2025-05-12
Payer: COMMERCIAL

## 2025-05-12 VITALS
HEIGHT: 61 IN | BODY MASS INDEX: 26.21 KG/M2 | DIASTOLIC BLOOD PRESSURE: 75 MMHG | SYSTOLIC BLOOD PRESSURE: 109 MMHG | WEIGHT: 138.8 LBS

## 2025-05-12 DIAGNOSIS — E78.00 PURE HYPERCHOLESTEROLEMIA, UNSPECIFIED: ICD-10-CM

## 2025-05-12 DIAGNOSIS — Z00.00 ENCOUNTER FOR GENERAL ADULT MEDICAL EXAMINATION WITHOUT ABNORMAL FINDINGS: ICD-10-CM

## 2025-05-12 DIAGNOSIS — Z01.419 ENCOUNTER FOR GYNECOLOGICAL EXAMINATION (GENERAL) (ROUTINE) W/OUT ABNORMAL FINDINGS: ICD-10-CM

## 2025-05-12 PROCEDURE — 99459 PELVIC EXAMINATION: CPT

## 2025-05-12 PROCEDURE — 99386 PREV VISIT NEW AGE 40-64: CPT

## 2025-05-12 PROCEDURE — 99396 PREV VISIT EST AGE 40-64: CPT

## 2025-05-12 PROCEDURE — 88142 CYTOPATH C/V THIN LAYER: CPT

## 2025-05-12 RX ORDER — ROSUVASTATIN CALCIUM 10 MG/1
10 TABLET, FILM COATED ORAL
Refills: 0 | Status: ACTIVE | COMMUNITY

## 2025-05-12 RX ORDER — METFORMIN HYDROCHLORIDE 500 MG/1
500 TABLET, COATED ORAL
Refills: 0 | Status: ACTIVE | COMMUNITY

## 2025-05-13 DIAGNOSIS — Z01.419 ENCOUNTER FOR GYNECOLOGICAL EXAMINATION (GENERAL) (ROUTINE) WITHOUT ABNORMAL FINDINGS: ICD-10-CM

## 2025-05-15 ENCOUNTER — OUTPATIENT (OUTPATIENT)
Dept: OUTPATIENT SERVICES | Facility: HOSPITAL | Age: 50
LOS: 1 days | End: 2025-05-15
Payer: COMMERCIAL

## 2025-05-15 PROCEDURE — 87624 HPV HI-RISK TYP POOLED RSLT: CPT

## 2025-05-16 DIAGNOSIS — Z00.00 ENCOUNTER FOR GENERAL ADULT MEDICAL EXAMINATION WITHOUT ABNORMAL FINDINGS: ICD-10-CM

## 2025-05-17 DIAGNOSIS — Z00.00 ENCOUNTER FOR GENERAL ADULT MEDICAL EXAMINATION WITHOUT ABNORMAL FINDINGS: ICD-10-CM

## 2025-05-18 LAB — HPV HIGH+LOW RISK DNA PNL CVX: NOT DETECTED

## 2025-05-19 DIAGNOSIS — Z00.00 ENCOUNTER FOR GENERAL ADULT MEDICAL EXAMINATION WITHOUT ABNORMAL FINDINGS: ICD-10-CM

## 2025-05-20 DIAGNOSIS — Z00.00 ENCOUNTER FOR GENERAL ADULT MEDICAL EXAMINATION WITHOUT ABNORMAL FINDINGS: ICD-10-CM
